# Patient Record
Sex: FEMALE | Race: WHITE | NOT HISPANIC OR LATINO | Employment: OTHER
[De-identification: names, ages, dates, MRNs, and addresses within clinical notes are randomized per-mention and may not be internally consistent; named-entity substitution may affect disease eponyms.]

---

## 2017-12-08 ENCOUNTER — HISTORIC RESULTS (OUTPATIENT)
Dept: ADMINISTRATIVE | Age: 77
End: 2017-12-08

## 2017-12-13 RX ORDER — LISINOPRIL AND HYDROCHLOROTHIAZIDE 12.5; 2 MG/1; MG/1
1 TABLET ORAL DAILY
COMMUNITY
End: 2020-10-13 | Stop reason: DRUGHIGH

## 2017-12-13 RX ORDER — ESTRADIOL 0.1 MG/G
2 CREAM VAGINAL
COMMUNITY
End: 2019-06-19

## 2017-12-13 RX ORDER — DORZOLAMIDE HYDROCHLORIDE AND TIMOLOL MALEATE 20; 5 MG/ML; MG/ML
1 SOLUTION/ DROPS OPHTHALMIC 2 TIMES DAILY
COMMUNITY

## 2017-12-13 RX ORDER — FLUTICASONE PROPIONATE 50 MCG
1 SPRAY, SUSPENSION (ML) NASAL DAILY
COMMUNITY

## 2017-12-14 ENCOUNTER — ANESTHESIA (OUTPATIENT)
Dept: SURGERY | Facility: CLINIC | Age: 77
End: 2017-12-14
Payer: MEDICARE

## 2017-12-14 ENCOUNTER — ANESTHESIA EVENT (OUTPATIENT)
Dept: SURGERY | Facility: CLINIC | Age: 77
End: 2017-12-14
Payer: MEDICARE

## 2017-12-14 ENCOUNTER — HOSPITAL ENCOUNTER (OUTPATIENT)
Facility: CLINIC | Age: 77
Discharge: HOME | End: 2017-12-14
Attending: OPHTHALMOLOGY | Admitting: OPHTHALMOLOGY
Payer: MEDICARE

## 2017-12-14 VITALS
SYSTOLIC BLOOD PRESSURE: 106 MMHG | BODY MASS INDEX: 28.34 KG/M2 | RESPIRATION RATE: 14 BRPM | OXYGEN SATURATION: 97 % | HEIGHT: 64 IN | TEMPERATURE: 97.8 F | WEIGHT: 166 LBS | DIASTOLIC BLOOD PRESSURE: 66 MMHG

## 2017-12-14 PROCEDURE — 40000170 ZZH STATISTIC PRE-PROCEDURE ASSESSMENT II: Performed by: OPHTHALMOLOGY

## 2017-12-14 PROCEDURE — 36000104 ZZH EYE SURGERY LEVEL 4 1ST 30 MIN: Performed by: OPHTHALMOLOGY

## 2017-12-14 PROCEDURE — 25000125 ZZHC RX 250: Performed by: NURSE ANESTHETIST, CERTIFIED REGISTERED

## 2017-12-14 PROCEDURE — 25000128 H RX IP 250 OP 636: Performed by: ANESTHESIOLOGY

## 2017-12-14 PROCEDURE — 66711 ECP CILIARY BODY DESTRUCTION: CPT | Performed by: OPHTHALMOLOGY

## 2017-12-14 PROCEDURE — 37000009 ZZH ANESTHESIA TECHNICAL FEE, EACH ADDTL 15 MIN: Performed by: OPHTHALMOLOGY

## 2017-12-14 PROCEDURE — 71000028 ZZH EYE RECOVERY PHASE 2 EACH 15 MINS: Performed by: OPHTHALMOLOGY

## 2017-12-14 PROCEDURE — 37000008 ZZH ANESTHESIA TECHNICAL FEE, 1ST 30 MIN: Performed by: OPHTHALMOLOGY

## 2017-12-14 PROCEDURE — 25000125 ZZHC RX 250: Performed by: OPHTHALMOLOGY

## 2017-12-14 PROCEDURE — 36000105 ZZH EYE SURGERY LEVEL 4 EA 15 ADDTL MIN: Performed by: OPHTHALMOLOGY

## 2017-12-14 PROCEDURE — 27210794 ZZH OR GENERAL SUPPLY STERILE: Performed by: OPHTHALMOLOGY

## 2017-12-14 PROCEDURE — 25000128 H RX IP 250 OP 636: Performed by: NURSE ANESTHETIST, CERTIFIED REGISTERED

## 2017-12-14 PROCEDURE — 25000128 H RX IP 250 OP 636: Performed by: OPHTHALMOLOGY

## 2017-12-14 PROCEDURE — V2632 POST CHMBR INTRAOCULAR LENS: HCPCS | Performed by: OPHTHALMOLOGY

## 2017-12-14 PROCEDURE — 25000125 ZZHC RX 250: Performed by: ANESTHESIOLOGY

## 2017-12-14 DEVICE — EYE IMP IOL AMO PCL TECNIS ZCB00 21.0: Type: IMPLANTABLE DEVICE | Site: EYE | Status: FUNCTIONAL

## 2017-12-14 RX ORDER — BALANCED SALT SOLUTION 6.4; .75; .48; .3; 3.9; 1.7 MG/ML; MG/ML; MG/ML; MG/ML; MG/ML; MG/ML
SOLUTION OPHTHALMIC PRN
Status: DISCONTINUED | OUTPATIENT
Start: 2017-12-14 | End: 2017-12-14 | Stop reason: HOSPADM

## 2017-12-14 RX ORDER — CYCLOPENTOLATE HYDROCHLORIDE 10 MG/ML
1 SOLUTION/ DROPS OPHTHALMIC
Status: COMPLETED | OUTPATIENT
Start: 2017-12-14 | End: 2017-12-14

## 2017-12-14 RX ORDER — LIDOCAINE HYDROCHLORIDE 10 MG/ML
INJECTION, SOLUTION EPIDURAL; INFILTRATION; INTRACAUDAL; PERINEURAL PRN
Status: DISCONTINUED | OUTPATIENT
Start: 2017-12-14 | End: 2017-12-14 | Stop reason: HOSPADM

## 2017-12-14 RX ORDER — DICLOFENAC SODIUM 1 MG/ML
1 SOLUTION/ DROPS OPHTHALMIC
Status: COMPLETED | OUTPATIENT
Start: 2017-12-14 | End: 2017-12-14

## 2017-12-14 RX ORDER — TETRACAINE HYDROCHLORIDE 5 MG/ML
SOLUTION OPHTHALMIC PRN
Status: DISCONTINUED | OUTPATIENT
Start: 2017-12-14 | End: 2017-12-14 | Stop reason: HOSPADM

## 2017-12-14 RX ORDER — PHENYLEPHRINE HYDROCHLORIDE 25 MG/ML
1 SOLUTION/ DROPS OPHTHALMIC
Status: COMPLETED | OUTPATIENT
Start: 2017-12-14 | End: 2017-12-14

## 2017-12-14 RX ORDER — PROPOFOL 10 MG/ML
INJECTION, EMULSION INTRAVENOUS PRN
Status: DISCONTINUED | OUTPATIENT
Start: 2017-12-14 | End: 2017-12-14

## 2017-12-14 RX ORDER — FENTANYL CITRATE 50 UG/ML
INJECTION, SOLUTION INTRAMUSCULAR; INTRAVENOUS PRN
Status: DISCONTINUED | OUTPATIENT
Start: 2017-12-14 | End: 2017-12-14

## 2017-12-14 RX ORDER — ONDANSETRON 2 MG/ML
INJECTION INTRAMUSCULAR; INTRAVENOUS PRN
Status: DISCONTINUED | OUTPATIENT
Start: 2017-12-14 | End: 2017-12-14

## 2017-12-14 RX ORDER — EPHEDRINE SULFATE 50 MG/ML
INJECTION, SOLUTION INTRAMUSCULAR; INTRAVENOUS; SUBCUTANEOUS PRN
Status: DISCONTINUED | OUTPATIENT
Start: 2017-12-14 | End: 2017-12-14

## 2017-12-14 RX ORDER — SODIUM CHLORIDE, SODIUM LACTATE, POTASSIUM CHLORIDE, CALCIUM CHLORIDE 600; 310; 30; 20 MG/100ML; MG/100ML; MG/100ML; MG/100ML
INJECTION, SOLUTION INTRAVENOUS CONTINUOUS
Status: DISCONTINUED | OUTPATIENT
Start: 2017-12-14 | End: 2017-12-14 | Stop reason: HOSPADM

## 2017-12-14 RX ORDER — LIDOCAINE HYDROCHLORIDE 20 MG/ML
INJECTION, SOLUTION INFILTRATION; PERINEURAL PRN
Status: DISCONTINUED | OUTPATIENT
Start: 2017-12-14 | End: 2017-12-14

## 2017-12-14 RX ORDER — MOXIFLOXACIN 5 MG/ML
1 SOLUTION/ DROPS OPHTHALMIC
Status: COMPLETED | OUTPATIENT
Start: 2017-12-14 | End: 2017-12-14

## 2017-12-14 RX ORDER — PROPARACAINE HYDROCHLORIDE 5 MG/ML
1 SOLUTION/ DROPS OPHTHALMIC ONCE
Status: COMPLETED | OUTPATIENT
Start: 2017-12-14 | End: 2017-12-14

## 2017-12-14 RX ORDER — TROPICAMIDE 10 MG/ML
1 SOLUTION/ DROPS OPHTHALMIC
Status: COMPLETED | OUTPATIENT
Start: 2017-12-14 | End: 2017-12-14

## 2017-12-14 RX ADMIN — DICLOFENAC SODIUM 1 DROP: 1 SOLUTION OPHTHALMIC at 07:24

## 2017-12-14 RX ADMIN — MOXIFLOXACIN 1 DROP: 5 SOLUTION/ DROPS OPHTHALMIC at 07:24

## 2017-12-14 RX ADMIN — DICLOFENAC SODIUM 1 DROP: 1 SOLUTION OPHTHALMIC at 07:20

## 2017-12-14 RX ADMIN — PHENYLEPHRINE HYDROCHLORIDE 1 DROP: 2.5 SOLUTION/ DROPS OPHTHALMIC at 07:20

## 2017-12-14 RX ADMIN — MOXIFLOXACIN 1 DROP: 5 SOLUTION/ DROPS OPHTHALMIC at 07:20

## 2017-12-14 RX ADMIN — PROPOFOL 60 MG: 10 INJECTION, EMULSION INTRAVENOUS at 08:18

## 2017-12-14 RX ADMIN — PROPARACAINE HYDROCHLORIDE 1 DROP: 5 SOLUTION/ DROPS OPHTHALMIC at 07:12

## 2017-12-14 RX ADMIN — CYCLOPENTOLATE HYDROCHLORIDE 1 DROP: 10 SOLUTION/ DROPS OPHTHALMIC at 07:32

## 2017-12-14 RX ADMIN — ONDANSETRON 4 MG: 2 INJECTION INTRAMUSCULAR; INTRAVENOUS at 08:40

## 2017-12-14 RX ADMIN — PHENYLEPHRINE HYDROCHLORIDE 1 DROP: 2.5 SOLUTION/ DROPS OPHTHALMIC at 07:24

## 2017-12-14 RX ADMIN — TROPICAMIDE 1 DROP: 10 SOLUTION/ DROPS OPHTHALMIC at 07:20

## 2017-12-14 RX ADMIN — LIDOCAINE HYDROCHLORIDE 60 MG: 20 INJECTION, SOLUTION INFILTRATION; PERINEURAL at 08:18

## 2017-12-14 RX ADMIN — LIDOCAINE HYDROCHLORIDE 1 ML: 10 INJECTION, SOLUTION EPIDURAL; INFILTRATION; INTRACAUDAL; PERINEURAL at 07:38

## 2017-12-14 RX ADMIN — DEXMEDETOMIDINE HYDROCHLORIDE 12 MCG: 100 INJECTION, SOLUTION INTRAVENOUS at 08:17

## 2017-12-14 RX ADMIN — Medication 5 MG: at 08:27

## 2017-12-14 RX ADMIN — CYCLOPENTOLATE HYDROCHLORIDE 1 DROP: 10 SOLUTION/ DROPS OPHTHALMIC at 07:20

## 2017-12-14 RX ADMIN — FENTANYL CITRATE 50 MCG: 50 INJECTION, SOLUTION INTRAMUSCULAR; INTRAVENOUS at 08:18

## 2017-12-14 RX ADMIN — DICLOFENAC SODIUM 1 DROP: 1 SOLUTION OPHTHALMIC at 07:12

## 2017-12-14 RX ADMIN — MOXIFLOXACIN 1 DROP: 5 SOLUTION/ DROPS OPHTHALMIC at 07:12

## 2017-12-14 RX ADMIN — PHENYLEPHRINE HYDROCHLORIDE 1 DROP: 2.5 SOLUTION/ DROPS OPHTHALMIC at 07:12

## 2017-12-14 RX ADMIN — DEXMEDETOMIDINE HYDROCHLORIDE 8 MCG: 100 INJECTION, SOLUTION INTRAVENOUS at 08:53

## 2017-12-14 RX ADMIN — SODIUM CHLORIDE, POTASSIUM CHLORIDE, SODIUM LACTATE AND CALCIUM CHLORIDE: 600; 310; 30; 20 INJECTION, SOLUTION INTRAVENOUS at 07:38

## 2017-12-14 RX ADMIN — CYCLOPENTOLATE HYDROCHLORIDE 1 DROP: 10 SOLUTION/ DROPS OPHTHALMIC at 07:12

## 2017-12-14 RX ADMIN — TROPICAMIDE 1 DROP: 10 SOLUTION/ DROPS OPHTHALMIC at 07:12

## 2017-12-14 RX ADMIN — TROPICAMIDE 1 DROP: 10 SOLUTION/ DROPS OPHTHALMIC at 07:24

## 2017-12-14 RX ADMIN — PROPOFOL 10 MG: 10 INJECTION, EMULSION INTRAVENOUS at 08:55

## 2017-12-14 ASSESSMENT — ENCOUNTER SYMPTOMS
SEIZURES: 0
ORTHOPNEA: 0

## 2017-12-14 NOTE — DISCHARGE INSTRUCTIONS
[facility] Discharge  Instructions  Anesthesia ([facility])   Adult Discharge Instructions    For 24 hours after surgery    1. Get plenty of rest.  Make arrangements to have a responsible adult stay with you for at least 6 hours after you leave the hospital.  2. Do not drive or use heavy equipment for 24 hours.    3. Do not drink alcohol for 24 hours.  4. Do not sign legal documents or make important decisions for 24 hours.  5. Avoid strenuous or risky activities. You may feel lightheaded.  If so, sit for a few minutes before standing.  Have someone help you get up.   6. Conscious sedation patients may resume a regular diet..  7. Any questions of medical nature, call your physician.    [facility]  Cataract Surgery Discharge Instructions  [facility] MD JAKE Rodríguez MD J. Hasan, MD C. Nichols, MD J. O'Neill, MD S. Schaefer, MD J. Stephens, MD        Start using drops when you arrive at home today    Vigamox or Ofloxacin (Tan top) - one drop in surgical eye 3 times  per day until gone.     Prednisolone acetate 1.0% (pink or white top) - one drop in surgical eye 3 times per day until gone.    Ketorolac or Diclofenac (Grey top) - one drop in surgical eye 3 times per day until gone.   (Dr. Zamora's patients should use all drops 4 times daily)       OR     You may have been prescribed SmartDrops or OneDrop, which is a compound formula drop that combines all three medications in a single drop. This drop should be instilled to the surgical eye 3 times daily until gone.        Place shield over surgical eye at bedtime for 3 nights.      The eye will feel itchy, scratchy, and vision will be blurred, you may take Tylenol for the scratchy feeling if this is bothersome.      No eye rubbing or swimming for I week.      You may resume all prescription medications as directed by your primary  doctor.      Call if increasing pain, progressively worsening vision or worsening redness of surgical eye.      On-call doctor can be reached at [phone].

## 2017-12-14 NOTE — ANESTHESIA PREPROCEDURE EVALUATION
"Procedure: Procedure(s):  COMBINED PHACOEMULSIFICATION CLEAR CORNEA WITH STANDARD INTRAOCULAR LENS IMPLANT, ENDOSCOPIC CYCLOPHOTOCOAGULATION  Preop diagnosis: RIGHT EYE CATARACT  No Known Allergies  There is no problem list on file for this patient.    Past Medical History:   Diagnosis Date     Allergic rhinitis      Closed dislocation, multiple and ill-defined sites      Compression fracture of spine (H)      Coronary artery disease      Degeneration of intervertebral disc, site unspecified      Disorder of bone and cartilage, unspecified      Essential hypertension, benign      Glaucoma of both eyes      Macular degeneration      Other abnormal clinical finding      Other and unspecified hyperlipidemia      Sprain and strain of unspecified site of knee and leg      Vitamin D deficiency      Past Surgical History:   Procedure Laterality Date     C NONSPECIFIC PROCEDURE  1988    rt shoulder dislocation and fx     C NONSPECIFIC PROCEDURE  1971    TL     GYN SURGERY      tubal     ORTHOPEDIC SURGERY Right     LYSSA       No current facility-administered medications on file prior to encounter.   No current outpatient prescriptions on file prior to encounter.  Ht 1.626 m (5' 4\")  Wt 75.3 kg (166 lb)  BMI 28.49 kg/m2     EKG - SR, LAD    Anesthesia Evaluation     . Pt has had prior anesthetic.     No history of anesthetic complications          ROS/MED HX    ENT/Pulmonary:     (+)allergic rhinitis, , . .   (-) sleep apnea and recent URI   Neurologic:  - neg neurologic ROS    (-) seizures, Neuropathy and migraines   Cardiovascular:     (+) hypertension--CAD (coronary calcifications), --. : . . . :. .      (-) angina, CHF, orthopnea/PND and angina   METS/Exercise Tolerance:     Hematologic:  - neg hematologic  ROS       Musculoskeletal: Comment: Low back pain  Compression fracture  osteoporosis        GI/Hepatic:  - neg GI/hepatic ROS      (-) GERD   Renal/Genitourinary:  - ROS Renal section negative       Endo:  - neg " endo ROS    (-) Type II DM and thyroid disease   Psychiatric:  - neg psychiatric ROS       Infectious Disease:         Malignancy:         Other: Comment: Glaucoma  Macular degeneration                    Physical Exam  Normal systems: cardiovascular, pulmonary and dental    Airway   Mallampati: II  TM distance: >3 FB  Neck ROM: full    Dental     Cardiovascular   Rhythm and rate: regular and normal      Pulmonary    breath sounds clear to auscultation                    Anesthesia Plan      History & Physical Review  History and physical reviewed and following examination; no interval change.    ASA Status:  2 .    NPO Status:  > 8 hours    Plan for MAC          Postoperative Care  Postoperative pain management:  IV analgesics.      Consents  Anesthetic plan, risks, benefits and alternatives discussed with:  Patient..                          .

## 2017-12-14 NOTE — ANESTHESIA CARE TRANSFER NOTE
Patient: Josi Singh    Procedure(s):  RIGHT EYE COMBINED PHACOEMULSIFICATION CLEAR CORNEA WITH STANDARD INTRAOCULAR LENS IMPLANT, ENDOSCOPIC CYCLOPHOTOCOAGULATION  - Wound Class: I-Clean    Diagnosis: RIGHT EYE CATARACT  Diagnosis Additional Information: No value filed.    Anesthesia Type:   MAC     Note:  Airway :Room Air  Patient transferred to:Phase II  Comments: Patient alert and appropriate. VSS. Report given to RN. Handoff Report: Identifed the Patient, Identified the Reponsible Provider, Reviewed the pertinent medical history, Discussed the surgical course, Reviewed Intra-OP anesthesia mangement and issues during anesthesia, Set expectations for post-procedure period and Allowed opportunity for questions and acknowledgement of understanding      Vitals: (Last set prior to Anesthesia Care Transfer)    CRNA VITALS  12/14/2017 0832 - 12/14/2017 0908      12/14/2017             Ht Rate: 66    Resp Rate (set): 10                Electronically Signed By: ILDA Savage CRNA  December 14, 2017  9:08 AM

## 2017-12-14 NOTE — IP AVS SNAPSHOT
[facility]    [address]    [place] [address]    Phone:  [phone]    Fax:  [phone]                                       After Visit Summary   12/14/2017    Josi Snigh    MRN: [id number]           After Visit Summary Signature Page     I have received my discharge instructions, and my questions have been answered. I have discussed any challenges I see with this plan with the nurse or doctor.    ..........................................................................................................................................  Patient/Patient Representative Signature      ..........................................................................................................................................  Patient Representative Print Name and Relationship to Patient    ..................................................               ................................................  Date                                            Time    ..........................................................................................................................................  Reviewed by Signature/Title    ...................................................              ..............................................  Date                                                            Time

## 2017-12-14 NOTE — ANESTHESIA POSTPROCEDURE EVALUATION
Patient: Josi Singh    Procedure(s):  RIGHT EYE COMBINED PHACOEMULSIFICATION CLEAR CORNEA WITH STANDARD INTRAOCULAR LENS IMPLANT, ENDOSCOPIC CYCLOPHOTOCOAGULATION  - Wound Class: I-Clean    Diagnosis:RIGHT EYE CATARACT  Diagnosis Additional Information: No value filed.    Anesthesia Type:  MAC    Note:  Anesthesia Post Evaluation    Patient location during evaluation: PACU  Patient participation: Able to fully participate in evaluation  Level of consciousness: awake  Pain management: adequate  Airway patency: patent  Cardiovascular status: acceptable  Respiratory status: acceptable  Hydration status: acceptable  PONV: none     Anesthetic complications: None          Last vitals:  Vitals:    12/14/17 0913 12/14/17 0925 12/14/17 0929   BP: 93/57 99/63 106/66   Resp:  14    Temp:      SpO2: 95% 97% 97%         Electronically Signed By: Jerry Alexis MD  December 14, 2017  12:33 PM

## 2017-12-14 NOTE — IP AVS SNAPSHOT
MRN:[id number]                      After Visit Summary   12/14/2017    Josi Singh    MRN: [id number]           Thank you!     Thank you for choosing [facility] for your care. Our goal is always to provide you with excellent care. Hearing back from our patients is one way we can continue to improve our services. Please take a few minutes to complete the written survey that you may receive in the mail after you visit with us. Thank you!        Patient Information     Date Of Birth          1940        About your hospital stay     You were admitted on:  December 14, 2017 You last received care in the:  [facility]    You were discharged on:  December 14, 2017       Who to Call     For medical emergencies, please call 911.  For non-urgent questions about your medical care, please call your primary care provider or clinic, [phone]  For questions related to your surgery, please call your surgery clinic        Attending Provider     Provider Specialty    Yosvany Zamora MD Ophthalmology       Primary Care Provider Office Phone # Fax #    Micaela PORTILLO Derek Marquez [phone]      Further instructions from your care team       [facility] Discharge  Instructions  Anesthesia ([facility] Care Center)   Adult Discharge Instructions    For 24 hours after surgery    1. Get plenty of rest.  Make arrangements to have a responsible adult stay with you for at least 6 hours after you leave the hospital.  2. Do not drive or use heavy equipment for 24 hours.    3. Do not drink alcohol for 24 hours.  4. Do not sign legal documents or make important decisions for 24 hours.  5. Avoid strenuous or risky activities. You may feel lightheaded.  If so, sit for a few minutes before standing.  Have someone help you get up.   6. Conscious sedation patients may resume a regular diet..  7. Any questions of medical nature, call your physician.    [facility]  "[facility]  Cataract Surgery Discharge Instructions  [facility] MD JAKE Rodríguez MD J. Hasan, MD C. Nichols, MD J. O'Neill, MD S. Schaefer, MD J. Stephens, MD        Start using drops when you arrive at home today    Vigamox or Ofloxacin (Tan top) - one drop in surgical eye 3 times  per day until gone.     Prednisolone acetate 1.0% (pink or white top) - one drop in surgical eye 3 times per day until gone.    Ketorolac or Diclofenac (Grey top) - one drop in surgical eye 3 times per day until gone.   (Dr. Zamora's patients should use all drops 4 times daily)       OR     You may have been prescribed SmartDrops or OneDrop, which is a compound formula drop that combines all three medications in a single drop. This drop should be instilled to the surgical eye 3 times daily until gone.        Place shield over surgical eye at bedtime for 3 nights.      The eye will feel itchy, scratchy, and vision will be blurred, you may take Tylenol for the scratchy feeling if this is bothersome.      No eye rubbing or swimming for I week.      You may resume all prescription medications as directed by your primary doctor.      Call if increasing pain, progressively worsening vision or worsening redness of surgical eye.      On-call doctor can be reached at [phone].    Pending Results     No orders found from 12/12/2017 to 12/15/2017.            Admission Information     Date & Time Provider Department Dept. Phone    12/14/2017 Yosvany Zamora MD [facility] [place] [phone]      Your Vitals Were     Blood Pressure Temperature Respirations Height Weight Pulse Oximetry    83/53 97.8  F (36.6  C) (Temporal) 16 1.626 m (5' 4\") 75.3 kg (166 lb) 96%    BMI (Body Mass Index)                   28.49 kg/m2           MyChart Information     [facility] [employer] lets you send messages to your doctor, view your test results, renew your " "prescriptions, schedule appointments and more. To sign up, go to [email or web address] . Click on \"Log in\" on the left side of the screen, which will take you to the Welcome page. Then click on \"Sign up Now\" on the right side of the page.     You will be asked to enter the access code listed below, as well as some personal information. Please follow the directions to create your username and password.     Your access code is: [email or web address]  Expires: 3/14/2018  9:13 AM     Your access code will  in 90 days. If you need help or a new code, please call your [facility] clinic or [phone].        Care EveryWhere ID     This is your Care EveryWhere ID. This could be used by other organizations to access your [facility] medical records  [id number]        Equal Access to Services     ROB ARELLANO : Carlie Puri, aimee bowen, mina israel, zakia amezcua . So [facility] [phone].    ATENCIÓN: Si habla español, tiene a gama disposición servicios gratuitos de asistencia lingüística. Alecia al [phone].    We comply with applicable federal civil rights laws and [place] laws. We do not discriminate on the basis of race, color, national origin, age, disability, sex, sexual orientation, or gender identity.               Review of your medicines      UNREVIEWED medicines. Ask your doctor about these medicines        Dose / Directions    ACETAMINOPHEN PO        Dose:  500 mg   Take 500 mg by mouth   Refills:  0       bimatoprost 0.01 % Soln   Commonly known as:  LUMIGAN        Dose:  1 drop   Place 1 drop into both eyes At Bedtime   Refills:  0       COSOPT 2-0.5 % ophthalmic solution   Generic drug:  dorzolamide-timolol        Dose:  1 drop   Place 1 drop into both eyes 2 times daily   Refills:  0       estradiol 0.1 MG/GM cream   Commonly known as:  ESTRACE        Dose:  2 g   Place 2 g vaginally three times a week   Refills:  0       fluticasone 50 MCG/ACT " spray   Commonly known as:  FLONASE        Dose:  1 spray   Spray 1 spray into both nostrils daily   Refills:  0       FOSAMAX PO        Dose:  70 mg   Take 70 mg by mouth once a week   Refills:  0       GABAPENTIN PO        Dose:  300 mg   Take 300 mg by mouth daily   Refills:  0       lisinopril-hydrochlorothiazide 20-12.5 MG per tablet   Commonly known as:  PRINZIDE/ZESTORETIC        Dose:  1 tablet   Take 1 tablet by mouth daily   Refills:  0                Protect others around you: Learn how to safely use, store and throw away your medicines at www.disposemymeds.org.             Medication List: This is a list of all your medications and when to take them. Check marks below indicate your daily home schedule. Keep this list as a reference.      Medications           Morning Afternoon Evening Bedtime As Needed    ACETAMINOPHEN PO   Take 500 mg by mouth                                bimatoprost 0.01 % Soln   Commonly known as:  LUMIGAN   Place 1 drop into both eyes At Bedtime                                COSOPT 2-0.5 % ophthalmic solution   Place 1 drop into both eyes 2 times daily   Generic drug:  dorzolamide-timolol                                estradiol 0.1 MG/GM cream   Commonly known as:  ESTRACE   Place 2 g vaginally three times a week                                fluticasone 50 MCG/ACT spray   Commonly known as:  FLONASE   Spray 1 spray into both nostrils daily                                FOSAMAX PO   Take 70 mg by mouth once a week                                GABAPENTIN PO   Take 300 mg by mouth daily                                lisinopril-hydrochlorothiazide 20-12.5 MG per tablet   Commonly known as:  PRINZIDE/ZESTORETIC   Take 1 tablet by mouth daily

## 2017-12-14 NOTE — BRIEF OP NOTE
[facility]  Ophthalmology Brief Operative Note    Pre-operative diagnosis: RIGHT EYE CATARACT   Post-operative diagnosis Same   Operative Eye Right   Procedure: Procedure(s):  COMBINED PHACOEMULSIFICATION CLEAR CORNEA WITH STANDARD INTRAOCULAR LENS IMPLANT, ENDOSCOPIC CYCLOPHOTOCOAGULATION   Surgeon: Yosvany Zamora MD   Anesthesia: Combined MAC with Retrobulbar    Estimated blood loss: Trace    Complications: None   Condition: Stable   Comments: See dictated operative report for full details         Implant Name Type Inv. Item Serial No.  Lot No. LRB No. Used   EYE IMP IOL JUWAN PCL TECNIS ZCB00 21.0 Lens/Eye Implant EYE IMP IOL JUWAN PCL TECNIS ZCB00 21.0 [id number] [employer]   Right 1

## 2017-12-15 NOTE — OP NOTE
DATE OF PROCEDURE:  12/14/2017       PREOPERATIVE DIAGNOSES:     1.  Cataract, right eye.    2.  Glaucoma, right eye.         POSTOPERATIVE DIAGNOSES:     1.  Cataract, right eye.    2.  Glaucoma, right eye.         PROCEDURE:     1.  Phacoemulsification with posterior chamber intraocular lens, right eye.     2.  Endocyclophotocoagulation, right eye.       ANESTHESIA:  Retrobulbar.   ESTIMATED BLOOD LOSS:  Trace.       PATIENT IDENTIFICATION:  Josi Singh is a 77-year-old female who has noted progressive decreased vision in her right eye.  After evaluation, she was found to have cataract and glaucoma as the primary cause of her 20/40 vision.  After a complete discussion of the risks and benefits of the procedure with the patient, she elected to have her cataract removed.  She understands that her final vision and healing will be limited by her glaucoma and high corneal astigmatism.         DESCRIPTION OF PROCEDURE:    Retrobulbar anesthesia was given in the operating room.  This consisted of a 50/50 mixture of 2% lidocaine and 0.75% bupivacaine.  A Honan balloon was placed over the operative eye for a few minutes.  The operative eye was then prepped and draped  in the usual sterile fashion, including drops of Betadine on its surface.       A paracentesis tract was placed through which 1% non-preserved lidocaine was instilled.  Viscoat was used to fill the anterior chamber.  A temporal clear corneal incision was made with a 2.4 mm keratome.  A circular continuous capsulorrhexis was fashioned with a cystotome and Utrata forceps.  Hydrodissection of the lens nucleus was performed and this was phacoemulsified using a modified stop and chop technique.  The remaining cortex was removed using automated irrigation and aspiration.  The posterior capsule was then filled with Provisc and a foldable JUWAN Model ZCB00 21.0 diopter lens was inserted into the capsular bag.  The remaining viscoelastic was removed using the automated  I&A tip.         Amvisc Plus was used to fill the ciliary sulcus of the patient's right eye.  A curved endocyclophotocoagulation probe was  introduced and a total of 60 pulses were given at a power setting of 0.25 santana.  The remaining viscoelastic was carefully removed.  The anterior chamber was reformed using balanced salt solution.  Drops of Betadine and Vigamox were placed over the operative eye, as was a clear shield.  The patient returned to the recovery area in stable condition.         EFRA HDZ MD, FACS             D: 2017 12:07   T: 2017 22:28   MT: CONSUELO#[id number]      Name:     HIMANSHU LORENZO   MRN:      [id number]        Account:        [id number]   :      1940           Procedure Date: 2017      Document: [id number]

## 2018-01-29 ENCOUNTER — HISTORIC RESULTS (OUTPATIENT)
Dept: ADMINISTRATIVE | Age: 78
End: 2018-01-29

## 2018-02-01 ENCOUNTER — ANESTHESIA (OUTPATIENT)
Dept: SURGERY | Facility: CLINIC | Age: 78
End: 2018-02-01
Payer: MEDICARE

## 2018-02-01 ENCOUNTER — ANESTHESIA EVENT (OUTPATIENT)
Dept: SURGERY | Facility: CLINIC | Age: 78
End: 2018-02-01
Payer: MEDICARE

## 2018-02-01 ENCOUNTER — HOSPITAL ENCOUNTER (OUTPATIENT)
Facility: CLINIC | Age: 78
Discharge: HOME | End: 2018-02-01
Attending: OPHTHALMOLOGY | Admitting: OPHTHALMOLOGY
Payer: MEDICARE

## 2018-02-01 VITALS
TEMPERATURE: 97.7 F | BODY MASS INDEX: 28.68 KG/M2 | RESPIRATION RATE: 16 BRPM | WEIGHT: 168 LBS | DIASTOLIC BLOOD PRESSURE: 68 MMHG | OXYGEN SATURATION: 98 % | SYSTOLIC BLOOD PRESSURE: 112 MMHG | HEIGHT: 64 IN

## 2018-02-01 PROCEDURE — 25000128 H RX IP 250 OP 636: Performed by: NURSE ANESTHETIST, CERTIFIED REGISTERED

## 2018-02-01 PROCEDURE — 36000104 ZZH EYE SURGERY LEVEL 4 1ST 30 MIN: Performed by: OPHTHALMOLOGY

## 2018-02-01 PROCEDURE — 25000125 ZZHC RX 250: Performed by: ANESTHESIOLOGY

## 2018-02-01 PROCEDURE — 66711 ECP CILIARY BODY DESTRUCTION: CPT | Performed by: OPHTHALMOLOGY

## 2018-02-01 PROCEDURE — 40000170 ZZH STATISTIC PRE-PROCEDURE ASSESSMENT II: Performed by: OPHTHALMOLOGY

## 2018-02-01 PROCEDURE — 36000105 ZZH EYE SURGERY LEVEL 4 EA 15 ADDTL MIN: Performed by: OPHTHALMOLOGY

## 2018-02-01 PROCEDURE — V2632 POST CHMBR INTRAOCULAR LENS: HCPCS | Performed by: OPHTHALMOLOGY

## 2018-02-01 PROCEDURE — 71000028 ZZH EYE RECOVERY PHASE 2 EACH 15 MINS: Performed by: OPHTHALMOLOGY

## 2018-02-01 PROCEDURE — 25000125 ZZHC RX 250: Performed by: OPHTHALMOLOGY

## 2018-02-01 PROCEDURE — 37000008 ZZH ANESTHESIA TECHNICAL FEE, 1ST 30 MIN: Performed by: OPHTHALMOLOGY

## 2018-02-01 PROCEDURE — 25000128 H RX IP 250 OP 636: Performed by: ANESTHESIOLOGY

## 2018-02-01 PROCEDURE — 25000125 ZZHC RX 250: Performed by: NURSE ANESTHETIST, CERTIFIED REGISTERED

## 2018-02-01 PROCEDURE — 37000009 ZZH ANESTHESIA TECHNICAL FEE, EACH ADDTL 15 MIN: Performed by: OPHTHALMOLOGY

## 2018-02-01 PROCEDURE — 27210794 ZZH OR GENERAL SUPPLY STERILE: Performed by: OPHTHALMOLOGY

## 2018-02-01 PROCEDURE — 25000128 H RX IP 250 OP 636: Performed by: OPHTHALMOLOGY

## 2018-02-01 DEVICE — EYE IMP IOL AMO PCL TECNIS ZCB00 20.5: Type: IMPLANTABLE DEVICE | Site: EYE | Status: FUNCTIONAL

## 2018-02-01 RX ORDER — TROPICAMIDE 10 MG/ML
1 SOLUTION/ DROPS OPHTHALMIC
Status: COMPLETED | OUTPATIENT
Start: 2018-02-01 | End: 2018-02-01

## 2018-02-01 RX ORDER — SODIUM CHLORIDE, SODIUM LACTATE, POTASSIUM CHLORIDE, CALCIUM CHLORIDE 600; 310; 30; 20 MG/100ML; MG/100ML; MG/100ML; MG/100ML
INJECTION, SOLUTION INTRAVENOUS CONTINUOUS
Status: DISCONTINUED | OUTPATIENT
Start: 2018-02-01 | End: 2018-02-01 | Stop reason: HOSPADM

## 2018-02-01 RX ORDER — PROPARACAINE HYDROCHLORIDE 5 MG/ML
1 SOLUTION/ DROPS OPHTHALMIC ONCE
Status: COMPLETED | OUTPATIENT
Start: 2018-02-01 | End: 2018-02-01

## 2018-02-01 RX ORDER — ONDANSETRON 2 MG/ML
INJECTION INTRAMUSCULAR; INTRAVENOUS PRN
Status: DISCONTINUED | OUTPATIENT
Start: 2018-02-01 | End: 2018-02-01

## 2018-02-01 RX ORDER — BALANCED SALT SOLUTION 6.4; .75; .48; .3; 3.9; 1.7 MG/ML; MG/ML; MG/ML; MG/ML; MG/ML; MG/ML
SOLUTION OPHTHALMIC PRN
Status: DISCONTINUED | OUTPATIENT
Start: 2018-02-01 | End: 2018-02-01 | Stop reason: HOSPADM

## 2018-02-01 RX ORDER — LIDOCAINE HYDROCHLORIDE 10 MG/ML
INJECTION, SOLUTION EPIDURAL; INFILTRATION; INTRACAUDAL; PERINEURAL PRN
Status: DISCONTINUED | OUTPATIENT
Start: 2018-02-01 | End: 2018-02-01 | Stop reason: HOSPADM

## 2018-02-01 RX ORDER — PROPOFOL 10 MG/ML
INJECTION, EMULSION INTRAVENOUS PRN
Status: DISCONTINUED | OUTPATIENT
Start: 2018-02-01 | End: 2018-02-01

## 2018-02-01 RX ORDER — CYCLOPENTOLATE HYDROCHLORIDE 10 MG/ML
1 SOLUTION/ DROPS OPHTHALMIC
Status: COMPLETED | OUTPATIENT
Start: 2018-02-01 | End: 2018-02-01

## 2018-02-01 RX ORDER — PHENYLEPHRINE HYDROCHLORIDE 25 MG/ML
1 SOLUTION/ DROPS OPHTHALMIC
Status: COMPLETED | OUTPATIENT
Start: 2018-02-01 | End: 2018-02-01

## 2018-02-01 RX ORDER — DICLOFENAC SODIUM 1 MG/ML
1 SOLUTION/ DROPS OPHTHALMIC
Status: COMPLETED | OUTPATIENT
Start: 2018-02-01 | End: 2018-02-01

## 2018-02-01 RX ORDER — MOXIFLOXACIN 5 MG/ML
1 SOLUTION/ DROPS OPHTHALMIC
Status: COMPLETED | OUTPATIENT
Start: 2018-02-01 | End: 2018-02-01

## 2018-02-01 RX ADMIN — PROPOFOL 30 MG: 10 INJECTION, EMULSION INTRAVENOUS at 08:10

## 2018-02-01 RX ADMIN — PHENYLEPHRINE HYDROCHLORIDE 1 DROP: 2.5 SOLUTION/ DROPS OPHTHALMIC at 07:00

## 2018-02-01 RX ADMIN — MOXIFLOXACIN 1 DROP: 5 SOLUTION/ DROPS OPHTHALMIC at 07:00

## 2018-02-01 RX ADMIN — ONDANSETRON 4 MG: 2 INJECTION INTRAMUSCULAR; INTRAVENOUS at 08:32

## 2018-02-01 RX ADMIN — DICLOFENAC SODIUM 1 DROP: 1 SOLUTION OPHTHALMIC at 06:56

## 2018-02-01 RX ADMIN — CYCLOPENTOLATE HYDROCHLORIDE 1 DROP: 10 SOLUTION/ DROPS OPHTHALMIC at 07:04

## 2018-02-01 RX ADMIN — DICLOFENAC SODIUM 1 DROP: 1 SOLUTION OPHTHALMIC at 07:00

## 2018-02-01 RX ADMIN — SODIUM CHLORIDE, POTASSIUM CHLORIDE, SODIUM LACTATE AND CALCIUM CHLORIDE: 600; 310; 30; 20 INJECTION, SOLUTION INTRAVENOUS at 07:06

## 2018-02-01 RX ADMIN — TROPICAMIDE 1 DROP: 10 SOLUTION/ DROPS OPHTHALMIC at 06:55

## 2018-02-01 RX ADMIN — PHENYLEPHRINE HYDROCHLORIDE 150 MCG: 10 INJECTION INTRAVENOUS at 08:43

## 2018-02-01 RX ADMIN — PHENYLEPHRINE HYDROCHLORIDE 1 DROP: 2.5 SOLUTION/ DROPS OPHTHALMIC at 06:55

## 2018-02-01 RX ADMIN — TROPICAMIDE 1 DROP: 10 SOLUTION/ DROPS OPHTHALMIC at 07:04

## 2018-02-01 RX ADMIN — MOXIFLOXACIN 1 DROP: 5 SOLUTION/ DROPS OPHTHALMIC at 06:56

## 2018-02-01 RX ADMIN — PHENYLEPHRINE HYDROCHLORIDE 1 DROP: 2.5 SOLUTION/ DROPS OPHTHALMIC at 07:05

## 2018-02-01 RX ADMIN — CYCLOPENTOLATE HYDROCHLORIDE 1 DROP: 10 SOLUTION/ DROPS OPHTHALMIC at 07:00

## 2018-02-01 RX ADMIN — DICLOFENAC SODIUM 1 DROP: 1 SOLUTION OPHTHALMIC at 07:04

## 2018-02-01 RX ADMIN — DEXMEDETOMIDINE HYDROCHLORIDE 8 MCG: 100 INJECTION, SOLUTION INTRAVENOUS at 08:06

## 2018-02-01 RX ADMIN — PHENYLEPHRINE HYDROCHLORIDE 100 MCG: 10 INJECTION INTRAVENOUS at 08:19

## 2018-02-01 RX ADMIN — PROPARACAINE HYDROCHLORIDE 1 DROP: 5 SOLUTION/ DROPS OPHTHALMIC at 06:55

## 2018-02-01 RX ADMIN — LIDOCAINE HYDROCHLORIDE 1 ML: 10 INJECTION, SOLUTION EPIDURAL; INFILTRATION; INTRACAUDAL; PERINEURAL at 07:07

## 2018-02-01 RX ADMIN — PHENYLEPHRINE HYDROCHLORIDE 100 MCG: 10 INJECTION INTRAVENOUS at 08:26

## 2018-02-01 RX ADMIN — MIDAZOLAM 1 MG: 1 INJECTION INTRAMUSCULAR; INTRAVENOUS at 08:04

## 2018-02-01 RX ADMIN — TROPICAMIDE 1 DROP: 10 SOLUTION/ DROPS OPHTHALMIC at 07:00

## 2018-02-01 RX ADMIN — MOXIFLOXACIN 1 DROP: 5 SOLUTION/ DROPS OPHTHALMIC at 07:04

## 2018-02-01 RX ADMIN — DEXMEDETOMIDINE HYDROCHLORIDE 8 MCG: 100 INJECTION, SOLUTION INTRAVENOUS at 08:24

## 2018-02-01 RX ADMIN — PHENYLEPHRINE HYDROCHLORIDE 150 MCG: 10 INJECTION INTRAVENOUS at 08:36

## 2018-02-01 RX ADMIN — CYCLOPENTOLATE HYDROCHLORIDE 1 DROP: 10 SOLUTION/ DROPS OPHTHALMIC at 06:56

## 2018-02-01 ASSESSMENT — LIFESTYLE VARIABLES: TOBACCO_USE: 0

## 2018-02-01 ASSESSMENT — COPD QUESTIONNAIRES: COPD: 0

## 2018-02-01 NOTE — OP NOTE
Procedure Date: 02/01/2018      PREOPERATIVE DIAGNOSES:     1.  Nuclear cataract, left eye.   2.  Glaucoma.       POSTOPERATIVE DIAGNOSES:     1.  Nuclear cataract, left eye.   2.  Glaucoma.      OPERATION:     1.  Phacoemulsification with posterior chamber intraocular lens, left eye.     2.  Endocyclophotocoagulation, left eye.       ANESTHESIA:  Retrobulbar.      ESTIMATED BLOOD LOSS:  Trace.       PATIENT IDENTIFICATION:  The patient is a 77-year-old female who has noted progressive decreased vision in her left eye.  After evaluation, she was found to have cataract and glaucoma as the primary causes of her 20/40 vision.  After a complete discussion of the risks and benefits of the procedure with the patient, she elected to have her cataract removed.  She requested a combined cataract and glaucoma surgery be performed.  She also requested that a lens implant be placed to leave her myopic.  No guarantees were given.       DESCRIPTION OF PROCEDURE:    Retrobulbar anesthesia was given in the operating room.  This consisted of a 50/50 mixture of 2% lidocaine and 0.75% bupivacaine.  A Honan balloon was placed over the operative eye for a few minutes.  The operative eye was then prepped and draped  in the usual sterile fashion, including drops of Betadine on its surface.       A paracentesis tract was placed through which 1% non-preserved lidocaine was instilled.  Viscoat was used to fill the anterior chamber.  A temporal clear corneal incision was made with a 2.4 mm keratome.  A circular continuous capsulorrhexis was fashioned with a cystotome and Utrata forceps.  Hydrodissection of the lens nucleus was performed and this was phacoemulsified using a modified stop and chop technique.  The remaining cortex was removed using automated irrigation and aspiration.  The posterior capsule was then filled with Provisc and a foldable JUWAN model ZCB00 20.5 diopter lens was inserted into the capsular bag.  The remaining  viscoelastic was removed using the automated I&A tip.         Amvisc Plus was used to fill the ciliary sulcus of the patient's left eye.  A curved endocyclophotocoagulation probe was  introduced and a total of 60 pulses were given at a power setting of 0.25 santana.  The remaining viscoelastic was carefully removed.  The anterior chamber was reformed using balanced salt solution.  Drops of Betadine and Vigamox were placed over the operative eye, as was a clear shield.  The patient returned to the recovery area in stable condition.              EFRA HDZ MD, FACS             D: 2018   T: 2018   MT: [id number]      Name:     HIMANSHU LORENZO   MRN:      [id number]        Account:        [id number]   :      1940           Procedure Date: 2018      Document: [id number]

## 2018-02-01 NOTE — ANESTHESIA POSTPROCEDURE EVALUATION
Patient: Josi Singh    Procedure(s):  LEFT EYE COMBINED PHACOEMULSIFICATION CLEAR CORNEA WITH STANDARD INTRAOCULAR LENS IMPLANT, ENDOSCOPIC CYCLOPHOTOCOAGULATION - Wound Class: I-Clean    Diagnosis:CATARACT  Diagnosis Additional Information: No value filed.    Anesthesia Type:  MAC    Note:  Anesthesia Post Evaluation    Patient location during evaluation: PACU  Patient participation: Able to fully participate in evaluation  Level of consciousness: awake and alert  Pain management: adequate  Airway patency: patent  Cardiovascular status: acceptable  Respiratory status: acceptable  Hydration status: acceptable  PONV: none     Anesthetic complications: None          Last vitals:  Vitals:    02/01/18 0857 02/01/18 0903 02/01/18 0927   BP: 103/64 102/68 112/68   Resp: 15 15 16   Temp:      SpO2: 94% 97% 98%         Electronically Signed By: Steve Sorto MD  February 1, 2018  10:03 AM

## 2018-02-01 NOTE — ANESTHESIA PREPROCEDURE EVALUATION
Anesthesia Evaluation     . Pt has had prior anesthetic.     No history of anesthetic complications          ROS/MED HX    ENT/Pulmonary:     (+)allergic rhinitis, , recent URI unresolved new dry cough, no fever or chills: . .   (-) tobacco use, asthma, COPD and sleep apnea   Neurologic:       Cardiovascular:     (+) hypertension--CAD (coronary calcifications), --. : . . . :. .       METS/Exercise Tolerance:     Hematologic:         Musculoskeletal: Comment: Low back pain  Compression fracture  osteoporosis        GI/Hepatic:        (-) GERD and liver disease   Renal/Genitourinary:      (-) renal disease   Endo:      (-) Type II DM and thyroid disease   Psychiatric:         Infectious Disease:         Malignancy:         Other: Comment: Glaucoma  Macular degeneration                    Physical Exam  Normal systems: cardiovascular, pulmonary and dental    Airway   Mallampati: II  TM distance: >3 FB  Neck ROM: full    Dental     Cardiovascular       Pulmonary                     Anesthesia Plan      History & Physical Review  History and physical reviewed and following examination; no interval change.    ASA Status:  2 .    NPO Status:  > 8 hours    Plan for MAC Reason for MAC:  Procedure to face, neck, head or breast  PONV prophylaxis:  Ondansetron (or other 5HT-3)  Propofol for sedation for retrobulbar block  Asked her to tell surgeon if she needs to cough      Postoperative Care  Postoperative pain management:  IV analgesics.      Consents  Anesthetic plan, risks, benefits and alternatives discussed with:  Patient..                          .

## 2018-02-01 NOTE — ANESTHESIA CARE TRANSFER NOTE
Patient: Josi Singh    Procedure(s):  LEFT EYE COMBINED PHACOEMULSIFICATION CLEAR CORNEA WITH STANDARD INTRAOCULAR LENS IMPLANT, ENDOSCOPIC CYCLOPHOTOCOAGULATION - Wound Class: I-Clean    Diagnosis: CATARACT  Diagnosis Additional Information: No value filed.    Anesthesia Type:   MAC     Note:  Airway :Room Air  Patient transferred to:PACU  Comments: Pt exhibits spont resps, all monitors and alarms on in pacu, report given to RN, vss.Handoff Report: Identifed the Patient, Identified the Reponsible Provider, Reviewed the pertinent medical history, Discussed the surgical course, Reviewed Intra-OP anesthesia mangement and issues during anesthesia, Set expectations for post-procedure period and Allowed opportunity for questions and acknowledgement of understanding      Vitals: (Last set prior to Anesthesia Care Transfer)    CRNA VITALS  2/1/2018 0811 - 2/1/2018 0849      2/1/2018             Pulse: 74    Ht Rate: 73    SpO2: 99 %    Resp Rate (set): 10                Electronically Signed By: ILDA Burgos CRNA  February 1, 2018  8:49 AM

## 2018-02-01 NOTE — BRIEF OP NOTE
[facility]  Ophthalmology Brief Operative Note    Pre-operative diagnosis: CATARACT   Post-operative diagnosis Same   Operative Eye Left   Procedure: Procedure(s):  COMBINED PHACOEMULSIFICATION CLEAR CORNEA WITH STANDARD INTRAOCULAR LENS IMPLANT, ENDOSCOPIC CYCLOPHOTOCOAGULATION   Surgeon: Yosvany Zamora MD   Anesthesia: Combined MAC with Retrobulbar    Estimated blood loss: Trace    Complications: None   Condition: Stable   Comments: See dictated operative report for full details         Implant Name Type Inv. Item Serial No.  Lot No. LRB No. Used   EYE IMP IOL JUWAN PCL TECNIS [id number] 20.5 Lens/Eye Implant EYE IMP IOL JUWAN PCL TECNIS ZCB00 20.5 [id number] [employer]   Left 1

## 2018-02-01 NOTE — IP AVS SNAPSHOT
[facility]    [address]    [place] [address]    Phone:  [phone]    Fax:  [phone]                                       After Visit Summary   2/1/2018    Josi Singh    MRN: [id number]           After Visit Summary Signature Page     I have received my discharge instructions, and my questions have been answered. I have discussed any challenges I see with this plan with the nurse or doctor.    ..........................................................................................................................................  Patient/Patient Representative Signature      ..........................................................................................................................................  Patient Representative Print Name and Relationship to Patient    ..................................................               ................................................  Date                                            Time    ..........................................................................................................................................  Reviewed by Signature/Title    ...................................................              ..............................................  Date                                                            Time

## 2018-02-01 NOTE — IP AVS SNAPSHOT
MRN:[id number]                      After Visit Summary   2/1/2018    Josi Singh    MRN: [id number]           Thank you!     Thank you for choosing [facility] for your care. Our goal is always to provide you with excellent care. Hearing back from our patients is one way we can continue to improve our services. Please take a few minutes to complete the written survey that you may receive in the mail after you visit with us. Thank you!        Patient Information     Date Of Birth          1940        About your hospital stay     You were admitted on:  February 1, 2018 You last received care in the:  [facility]    You were discharged on:  February 1, 2018       Who to Call     For medical emergencies, please call 911.  For non-urgent questions about your medical care, please call your primary care provider or clinic, [phone]  For questions related to your surgery, please call your surgery clinic        Attending Provider     Provider Specialty    Yosvany Zamora MD Ophthalmology       Primary Care Provider Office Phone # Fax #    Micaela PORTILLO Derek Marquez [phone]      Further instructions from your care team       [facility] Discharge  Instructions  Anesthesia ([facility] Center)   Adult Discharge Instructions    For 24 hours after surgery    1. Get plenty of rest.  Make arrangements to have a responsible adult stay with you for at least 6 hours after you leave the hospital.  2. Do not drive or use heavy equipment for 24 hours.    3. Do not drink alcohol for 24 hours.  4. Do not sign legal documents or make important decisions for 24 hours.  5. Avoid strenuous or risky activities. You may feel lightheaded.  If so, sit for a few minutes before standing.  Have someone help you get up.   6. Conscious sedation patients may resume a regular diet..  7. Any questions of medical nature, call your physician.    [facility]  "[facility]  Cataract Surgery Discharge Instructions  [facility] MD JAKE Rodríguez MD J. Hasan, MD C. Nichols, MD J. O'Neill, MD S. Schaefer, MD J. Stephens, MD        Start using drops when you arrive at home today    Vigamox or Ofloxacin (Tan top) - one drop in surgical eye 3 times  per day until gone.     Prednisolone acetate 1.0% (pink or white top) - one drop in surgical eye 3 times per day until gone.    Ketorolac or Diclofenac (Grey top) - one drop in surgical eye 3 times per day until gone.   (Dr. Zamora's patients should use all drops 4 times daily)       OR     You may have been prescribed SmartDrops or OneDrop, which is a compound formula drop that combines all three medications in a single drop. This drop should be instilled to the surgical eye 3 times daily until gone.        Place shield over surgical eye at bedtime for 3 nights.      The eye will feel itchy, scratchy, and vision will be blurred, you may take Tylenol for the scratchy feeling if this is bothersome.      No eye rubbing or swimming for I week.      You may resume all prescription medications as directed by your primary doctor.      Call if increasing pain, progressively worsening vision or worsening redness of surgical eye.      On-call doctor can be reached at [phone].    Pending Results     No orders found from 1/30/2018 to 2/2/2018.            Admission Information     Date & Time Provider Department Dept. Phone    2/1/2018 Yosvany Zamora MD [facility] [phone]      Your Vitals Were     Blood Pressure Temperature Respirations Height Weight Pulse Oximetry    97/64 97.7  F (36.5  C) (Temporal) 15 1.626 m (5' 4\") 76.2 kg (168 lb) 92%    BMI (Body Mass Index)                   28.84 kg/m2           MyChart Information     [facility] lets you send messages to your doctor, view your test results, renew your " "prescriptions, schedule appointments and more. To sign up, go to [email or web address] . Click on \"Log in\" on the left side of the screen, which will take you to the Welcome page. Then click on \"Sign up Now\" on the right side of the page.     You will be asked to enter the access code listed below, as well as some personal information. Please follow the directions to create your username and password.     Your access code is: [email or web address]  Expires: 3/14/2018  9:13 AM     Your access code will  in 90 days. If you need help or a new code, please call your [facility] clinic or [phone].        Care EveryWhere ID     This is your Care EveryWhere ID. This could be used by other organizations to access your [facility] medical records  [id number]        Equal Access to Services     ROB ARELLANO : Carlie Puri, aimee bowen, mina israel, zakia amezcua . So [place] [phone].    ATENCIÓN: Si habla español, tiene a gama disposición servicios gratuitos de asistencia lingüística. Alecia al [phone].    We comply with applicable federal civil rights laws and [place] laws. We do not discriminate on the basis of race, color, national origin, age, disability, sex, sexual orientation, or gender identity.               Review of your medicines      UNREVIEWED medicines. Ask your doctor about these medicines        Dose / Directions    ACETAMINOPHEN PO        Dose:  500 mg   Take 500 mg by mouth   Refills:  0       bimatoprost 0.01 % Soln   Commonly known as:  LUMIGAN        Dose:  1 drop   Place 1 drop into both eyes At Bedtime   Refills:  0       COSOPT 2-0.5 % ophthalmic solution   Generic drug:  dorzolamide-timolol        Dose:  1 drop   Place 1 drop into both eyes 2 times daily   Refills:  0       estradiol 0.1 MG/GM cream   Commonly known as:  ESTRACE        Dose:  2 g   Place 2 g vaginally three times a week   Refills:  0       fluticasone 50 MCG/ACT " spray   Commonly known as:  FLONASE        Dose:  1 spray   Spray 1 spray into both nostrils daily   Refills:  0       FOSAMAX PO        Dose:  70 mg   Take 70 mg by mouth once a week   Refills:  0       GABAPENTIN PO        Dose:  300 mg   Take 300 mg by mouth daily   Refills:  0       lisinopril-hydrochlorothiazide 20-12.5 MG per tablet   Commonly known as:  PRINZIDE/ZESTORETIC        Dose:  1 tablet   Take 1 tablet by mouth daily   Refills:  0                Protect others around you: Learn how to safely use, store and throw away your medicines at www.disposemymeds.org.             Medication List: This is a list of all your medications and when to take them. Check marks below indicate your daily home schedule. Keep this list as a reference.      Medications           Morning Afternoon Evening Bedtime As Needed    ACETAMINOPHEN PO   Take 500 mg by mouth                                bimatoprost 0.01 % Soln   Commonly known as:  LUMIGAN   Place 1 drop into both eyes At Bedtime                                COSOPT 2-0.5 % ophthalmic solution   Place 1 drop into both eyes 2 times daily   Generic drug:  dorzolamide-timolol                                estradiol 0.1 MG/GM cream   Commonly known as:  ESTRACE   Place 2 g vaginally three times a week                                fluticasone 50 MCG/ACT spray   Commonly known as:  FLONASE   Spray 1 spray into both nostrils daily                                FOSAMAX PO   Take 70 mg by mouth once a week                                GABAPENTIN PO   Take 300 mg by mouth daily                                lisinopril-hydrochlorothiazide 20-12.5 MG per tablet   Commonly known as:  PRINZIDE/ZESTORETIC   Take 1 tablet by mouth daily

## 2018-02-01 NOTE — DISCHARGE INSTRUCTIONS
[facility] Anesthesia [facility] Discharge  Instructions  Anesthesia ([facility] Care Center)   Adult Discharge Instructions    For 24 hours after surgery    1. Get plenty of rest.  Make arrangements to have a responsible adult stay with you for at least 6 hours after you leave the hospital.  2. Do not drive or use heavy equipment for 24 hours.    3. Do not drink alcohol for 24 hours.  4. Do not sign legal documents or make important decisions for 24 hours.  5. Avoid strenuous or risky activities. You may feel lightheaded.  If so, sit for a few minutes before standing.  Have someone help you get up.   6. Conscious sedation patients may resume a regular diet..  7. Any questions of medical nature, call your physician.    [facility]  Cataract Surgery Discharge Instructions  [facility] MD JAKE Rodríguez MD J. Hasan, MD C. Nichols, MD J. O'Neill, MD S. Schaefer, MD J. Stephens, MD        Start using drops when you arrive at home today    Vigamox or Ofloxacin (Tan top) - one drop in surgical eye 3 times  per day until gone.     Prednisolone acetate 1.0% (pink or white top) - one drop in surgical eye 3 times per day until gone.    Ketorolac or Diclofenac (Grey top) - one drop in surgical eye 3 times per day until gone.   (Dr. Zamora's patients should use all drops 4 times daily)       OR     You may have been prescribed SmartDrops or OneDrop, which is a compound formula drop that combines all three medications in a single drop. This drop should be instilled to the surgical eye 3 times daily until gone.        Place shield over surgical eye at bedtime for 3 nights.      The eye will feel itchy, scratchy, and vision will be blurred, you may take Tylenol for the scratchy feeling if this is bothersome.      No eye rubbing or swimming for I week.      You may resume all prescription medications as directed by your primary  doctor.      Call if increasing pain, progressively worsening vision or worsening redness of surgical eye.      On-call doctor can be reached at [phone].

## 2018-02-01 NOTE — OR NURSING
Printed and verbal instructions given to patient and family.  Patient/Family verbalized understanding. No knowledge deficit noted.  Implant card and (4) eye drop/s given to patient/care taker. Belongings returned to patient/family. Patient assisted to a wheel chair and was  brought to the hospital door by  volunteer service  for discharged to home.

## 2019-06-19 ENCOUNTER — OFFICE VISIT (OUTPATIENT)
Dept: FAMILY MEDICINE | Facility: OTHER | Age: 79
End: 2019-06-19
Attending: PHYSICIAN ASSISTANT
Payer: MEDICARE

## 2019-06-19 VITALS
BODY MASS INDEX: 29.63 KG/M2 | RESPIRATION RATE: 18 BRPM | TEMPERATURE: 97.6 F | WEIGHT: 172.6 LBS | HEART RATE: 68 BPM | DIASTOLIC BLOOD PRESSURE: 92 MMHG | SYSTOLIC BLOOD PRESSURE: 138 MMHG

## 2019-06-19 DIAGNOSIS — N39.0 ACUTE UTI: Primary | ICD-10-CM

## 2019-06-19 DIAGNOSIS — R39.9 UTI SYMPTOMS: ICD-10-CM

## 2019-06-19 LAB
ALBUMIN UR-MCNC: 30 MG/DL
APPEARANCE UR: CLEAR
BACTERIA #/AREA URNS HPF: ABNORMAL /HPF
BILIRUB UR QL STRIP: NEGATIVE
COLOR UR AUTO: YELLOW
GLUCOSE UR STRIP-MCNC: NEGATIVE MG/DL
HGB UR QL STRIP: ABNORMAL
KETONES UR STRIP-MCNC: NEGATIVE MG/DL
LEUKOCYTE ESTERASE UR QL STRIP: ABNORMAL
NITRATE UR QL: POSITIVE
NON-SQ EPI CELLS #/AREA URNS LPF: ABNORMAL /LPF
PH UR STRIP: 6 PH (ref 5–9)
RBC #/AREA URNS AUTO: ABNORMAL /HPF
SOURCE: ABNORMAL
SP GR UR STRIP: 1.01 (ref 1–1.03)
UROBILINOGEN UR STRIP-ACNC: 0.2 EU/DL (ref 0.2–1)
WBC #/AREA URNS AUTO: >100 /HPF

## 2019-06-19 PROCEDURE — G0463 HOSPITAL OUTPT CLINIC VISIT: HCPCS

## 2019-06-19 PROCEDURE — 87086 URINE CULTURE/COLONY COUNT: CPT | Mod: ZL | Performed by: PHYSICIAN ASSISTANT

## 2019-06-19 PROCEDURE — 99213 OFFICE O/P EST LOW 20 MIN: CPT | Performed by: PHYSICIAN ASSISTANT

## 2019-06-19 PROCEDURE — 81001 URINALYSIS AUTO W/SCOPE: CPT | Mod: ZL | Performed by: PHYSICIAN ASSISTANT

## 2019-06-19 PROCEDURE — 87077 CULTURE AEROBIC IDENTIFY: CPT | Mod: ZL | Performed by: PHYSICIAN ASSISTANT

## 2019-06-19 RX ORDER — CLINDAMYCIN HCL 300 MG
CAPSULE ORAL
Refills: 2 | COMMUNITY
Start: 2019-04-20 | End: 2020-08-08

## 2019-06-19 RX ORDER — CIPROFLOXACIN 250 MG/1
250 TABLET, FILM COATED ORAL 2 TIMES DAILY
Qty: 14 TABLET | Refills: 0 | Status: SHIPPED | OUTPATIENT
Start: 2019-06-19 | End: 2019-06-26

## 2019-06-19 RX ORDER — DIPHENOXYLATE HYDROCHLORIDE AND ATROPINE SULFATE 2.5; .025 MG/1; MG/1
1 TABLET ORAL
COMMUNITY
Start: 2019-01-07

## 2019-06-19 NOTE — NURSING NOTE
Chief Complaint   Patient presents with     Urinary Problem         Medication Reconciliation: complete    Maci Pinon, LPN

## 2019-06-19 NOTE — PROGRESS NOTES
Nursing Notes:   Maci Pinon LPN  6/19/2019  9:24 AM  Signed  Chief Complaint   Patient presents with     Urinary Problem         Medication Reconciliation: complete    Maci Pinon LPN      HPI:    Josi Singh is a 78 year old female who presents for urinary symptoms.  Symptoms have been gradually coming on for 1.5 weeks. Dysuria.  Urinary frequency.  Hx bladder cancer.  Patient follows with oncology.  She is nervous today as she does not want her bladder cancer to come back.  No fevers, chills, belly pain, back pain.  No blood in her stool or urine.    Past Medical History:   Diagnosis Date     Allergic rhinitis      Closed dislocation, multiple and ill-defined sites      Compression fracture of spine (H)      Coronary artery disease      Degeneration of intervertebral disc, site unspecified      Disorder of bone and cartilage, unspecified      Essential hypertension, benign      Glaucoma of both eyes      Macular degeneration      Other abnormal clinical finding      Other and unspecified hyperlipidemia      Sprain and strain of unspecified site of knee and leg      Vitamin D deficiency        Past Surgical History:   Procedure Laterality Date     C NONSPECIFIC PROCEDURE  1988    rt shoulder dislocation and fx     C NONSPECIFIC PROCEDURE  1971    TL     GYN SURGERY      tubal     ORTHOPEDIC SURGERY Right     LYSSA     PHACOEMULSIFICATION CLEAR CORNEA W/ STANDARD IOL IMPLANT, ENDOSCOPIC CYCLOPHOTOCOAGULATION, COMBINED Right 12/14/2017    Procedure: COMBINED PHACOEMULSIFICATION CLEAR CORNEA WITH STANDARD INTRAOCULAR LENS IMPLANT, ENDOSCOPIC CYCLOPHOTOCOAGULATION;  RIGHT EYE COMBINED PHACOEMULSIFICATION CLEAR CORNEA WITH STANDARD INTRAOCULAR LENS IMPLANT, ENDOSCOPIC CYCLOPHOTOCOAGULATION ;  Surgeon: Yosvany Zamora MD;  Location: [facility]     PHACOEMULSIFICATION CLEAR CORNEA W/ STANDARD IOL IMPLANT, ENDOSCOPIC CYCLOPHOTOCOAGULATION, COMBINED Left 2/1/2018    Procedure: COMBINED  PHACOEMULSIFICATION CLEAR CORNEA WITH STANDARD INTRAOCULAR LENS IMPLANT, ENDOSCOPIC CYCLOPHOTOCOAGULATION;  LEFT EYE COMBINED PHACOEMULSIFICATION CLEAR CORNEA WITH STANDARD INTRAOCULAR LENS IMPLANT, ENDOSCOPIC CYCLOPHOTOCOAGULATION;  Surgeon: Yosvany Zamora MD;  Location: [facility]       History reviewed. No pertinent family history.    Social History     Tobacco Use     Smoking status: Never Smoker     Smokeless tobacco: Never Used   Substance Use Topics     Alcohol use: Yes     Comment: occ       Current Outpatient Medications   Medication Sig Dispense Refill     ACETAMINOPHEN PO Take 500 mg by mouth       Alendronate Sodium (FOSAMAX PO) Take 70 mg by mouth once a week       bimatoprost (LUMIGAN) 0.01 % SOLN Place 1 drop into both eyes At Bedtime       Calcium 250 MG CAPS        calcium-vitamin D 500-125 MG-UNIT TABS        ciprofloxacin (CIPRO) 250 MG tablet Take 1 tablet (250 mg) by mouth 2 times daily for 7 days 14 tablet 0     clindamycin (CLEOCIN) 300 MG capsule TK 2 C 1 HOUR PRIOR TO APPT  2     dorzolamide-timolol (COSOPT) 2-0.5 % ophthalmic solution Place 1 drop into both eyes 2 times daily       fluticasone (FLONASE) 50 MCG/ACT spray Spray 1 spray into both nostrils daily       GABAPENTIN PO Take 300 mg by mouth daily       lisinopril-hydrochlorothiazide (PRINZIDE/ZESTORETIC) 20-12.5 MG per tablet Take 1 tablet by mouth daily       Multiple Vitamin (MULTI-VITAMINS) TABS Take 1 tablet by mouth         No Known Allergies    REVIEW OF SYSTEMS:  Refer to HPI.    EXAM:   Vitals:    BP (!) 138/92 (BP Location: Right arm, Patient Position: Sitting, Cuff Size: Adult Regular)   Pulse 68   Temp 97.6  F (36.4  C)   Resp 18   Wt 78.3 kg (172 lb 9.6 oz)   BMI 29.63 kg/m      General Appearance: Pleasant, alert, appropriate appearance for age. No acute distress  Chest/Respiratory Exam: Normal chest wall and respirations. Clear to auscultation.  Cardiovascular Exam: Regular rate and rhythm. S1, S2, no murmur,  click, gallop, or rubs.  Gastrointestinal Exam: Soft, no masses or organomegaly. Abdomen non-tender. Normal BS x 4. No suprapubic tenderness. No CVA tenderness to palpation. No rebound tenderness or guarding.  Psychiatric Exam: Alert and oriented - appropriate affect.    PHQDepression Screen  No flowsheet data found.    Labs:   Results for orders placed or performed in visit on 06/19/19   Urinalysis w Reflex Microscopic If Positive   Result Value Ref Range    Color Urine Yellow     Appearance Urine Clear     Glucose Urine Negative NEG^Negative mg/dL    Bilirubin Urine Negative NEG^Negative    Ketones Urine Negative NEG^Negative mg/dL    Specific Gravity Urine 1.015 1.000 - 1.030    Blood Urine Small (A) NEG^Negative    pH Urine 6.0 5.0 - 9.0 pH    Protein Albumin Urine 30 (A) NEG^Negative mg/dL    Urobilinogen Urine 0.2 0.2 - 1.0 EU/dL    Nitrite Urine Positive (A) NEG^Negative    Leukocyte Esterase Urine Moderate (A) NEG^Negative    Source Urine    Urine Microscopic   Result Value Ref Range    WBC Urine >100 (A) OTO5^0 - 5 /HPF    RBC Urine 10-25 (A) OTO2^O - 2 /HPF    Squamous Epithelial /LPF Urine Few FEW^Few /LPF    Bacteria Urine Many (A) NEG^Negative /HPF   Urine Culture Aerobic Bacterial   Result Value Ref Range    Specimen Description Midstream Urine     Culture Micro >100,000 colonies/mL  Escherichia coli   (A)        Susceptibility    Escherichia coli - JESUS     AMPICILLIN <=8 Sensitive ug/mL     AZTREONAM <=8 Sensitive ug/mL     CEFEPIME <=8 Sensitive ug/mL     CEFTRIAXONE <=8 Sensitive ug/mL     CEFTAZIDIME <=1 Sensitive ug/mL     CEFOTAXIME <=2 Sensitive ug/mL     CIPROFLOXACIN <=1 Sensitive ug/mL     CEFUROXIME <=4 Sensitive ug/mL     NITROFURANTOIN <=32 Sensitive ug/mL     GENTAMICIN <=4 Sensitive ug/mL     IMIPENEM <=1 Sensitive ug/mL     LEVOFLOXACIN <=2 Sensitive ug/mL     Piperacillin/Tazo <=16 Sensitive ug/mL     Trimethoprim/Sulfa <=2/38 Sensitive ug/mL     TETRACYCLINE <=4 Sensitive ug/mL        ASSESSMENT AND PLAN:      ICD-10-CM    1. Acute UTI N39.0 Urine Culture Aerobic Bacterial     ciprofloxacin (CIPRO) 250 MG tablet   2. UTI symptoms R39.9 Urinalysis w Reflex Microscopic If Positive     Urine Microscopic       Patient has nitrites, urinary leukocytes and hematuria appreciated on her urinalysis.  Urine culture is positive for E. coli bacteria.  Patient was started ciprofloxacin 250 mg twice daily for 7 days for an acute UTI treatment.  Encourage repeat urinalysis/urine culture in 2 weeks for monitoring to ensure the infection has resolved.    Antibiotic has been sent to pharmacy. Please take full course of antibiotic even if symptoms have completely resolved. This helps prevent against antibiotic resistance.     We will culture the urine to see what bacteria grows out of the urine.  We will call the patient if a change of antibiotic is necessary per the culture.  Patient was instructed in increase fluids including water and cranberry juice.  Monitor for fevers, chills, back pain.  Return to clinic after antibiotic completion if symptoms are not resolved.  Call clinic if symptoms change/worsen.      Patient Instructions   Antibiotic has been sent to pharmacy. Please take full course of antibiotic even if symptoms have completely resolved. This helps prevent against antibiotic resistance.     We will culture the urine to see what bacteria grows out of the urine.  We will call the patient if a change of antibiotic is necessary per the culture.  Patient was instructed in increase fluids including water and cranberry juice.  Monitor for fevers, chills, back pain.  Return to clinic after antibiotic completion if symptoms are not resolved.  Call clinic if symptoms change/worsen.            Hamida Proctor PA-C..................6/19/2019 9:03 AM

## 2019-06-19 NOTE — PATIENT INSTRUCTIONS
Antibiotic has been sent to pharmacy. Please take full course of antibiotic even if symptoms have completely resolved. This helps prevent against antibiotic resistance.     We will culture the urine to see what bacteria grows out of the urine.  We will call the patient if a change of antibiotic is necessary per the culture.  Patient was instructed in increase fluids including water and cranberry juice.  Monitor for fevers, chills, back pain.  Return to clinic after antibiotic completion if symptoms are not resolved.  Call clinic if symptoms change/worsen.

## 2019-06-21 ENCOUNTER — TELEPHONE (OUTPATIENT)
Dept: FAMILY MEDICINE | Facility: OTHER | Age: 79
End: 2019-06-21

## 2019-06-21 DIAGNOSIS — N39.0 ACUTE UTI: Primary | ICD-10-CM

## 2019-06-21 NOTE — TELEPHONE ENCOUNTER
She would a phone call regarding the final results. I did discuss preliminary result of urine culture and told her to continue with current antibiotics.

## 2019-06-21 NOTE — TELEPHONE ENCOUNTER
Pt calling regarding urinary culture results.  Pt is leaving sometime on Monday for [place], would like to get verbal results before she leaves.  Please call pt.

## 2019-06-22 LAB
BACTERIA SPEC CULT: ABNORMAL
SPECIMEN SOURCE: ABNORMAL

## 2019-06-24 NOTE — TELEPHONE ENCOUNTER
Spoke with patient who confirmed her last name and birth date, informed her of Hamida Proctor NP note and transferred her to scheduling to make a lab only apt.  Elma Ohara LPN 6/24/2019 1:09 PM

## 2019-06-24 NOTE — TELEPHONE ENCOUNTER
Your urine culture grew out E. coli bacteria indicating that you have a bladder infection.  Please complete the full course of the ciprofloxacin antibiotic for treatment.  I would recommend completing a repeat urinalysis in 2 weeks to ensure that the infection has resolved.  Lab orders have been placed.  Please schedule lab only appointment in 2 weeks to give a repeat urine sample.  Please return sooner if symptoms have not resolved or worsening.  Hamida Proctor PA-C.......... 6/24/2019 11:11 AM

## 2019-06-24 NOTE — TELEPHONE ENCOUNTER
Attempted to contact patient, no answer and unable to leave a message.  Elma Ohara LPN 6/24/2019 11:35 AM

## 2020-08-08 ENCOUNTER — OFFICE VISIT (OUTPATIENT)
Dept: FAMILY MEDICINE | Facility: OTHER | Age: 80
End: 2020-08-08
Attending: NURSE PRACTITIONER
Payer: MEDICARE

## 2020-08-08 VITALS
OXYGEN SATURATION: 95 % | RESPIRATION RATE: 19 BRPM | SYSTOLIC BLOOD PRESSURE: 146 MMHG | BODY MASS INDEX: 28.97 KG/M2 | DIASTOLIC BLOOD PRESSURE: 88 MMHG | HEART RATE: 60 BPM | WEIGHT: 168.8 LBS | TEMPERATURE: 97.5 F

## 2020-08-08 DIAGNOSIS — H57.89 IRRITATION OF LEFT EYE: Primary | ICD-10-CM

## 2020-08-08 PROCEDURE — 99203 OFFICE O/P NEW LOW 30 MIN: CPT | Performed by: NURSE PRACTITIONER

## 2020-08-08 PROCEDURE — G0463 HOSPITAL OUTPT CLINIC VISIT: HCPCS

## 2020-08-08 RX ORDER — ERYTHROMYCIN 5 MG/G
0.5 OINTMENT OPHTHALMIC
Qty: 3.5 G | Refills: 0 | Status: SHIPPED | OUTPATIENT
Start: 2020-08-08 | End: 2020-08-15

## 2020-08-08 SDOH — HEALTH STABILITY: MENTAL HEALTH: HOW OFTEN DO YOU HAVE A DRINK CONTAINING ALCOHOL?: MONTHLY OR LESS

## 2020-08-08 ASSESSMENT — PAIN SCALES - GENERAL: PAINLEVEL: MILD PAIN (3)

## 2020-08-08 NOTE — PROGRESS NOTES
HPI:    Josi Singh is a 79 year old female who presents to clinic today for left eye irritation.  She reports she woke up this morning and felt like she has sand or gravel in her left eye.  She is wondering if she is got his stye developing.  She tried to rinse this with saline which was not helpful.  No drainage that she has noted.  She has not had any trauma to her eye.  Does not wear contacts.    Past Medical History:   Diagnosis Date     Allergic rhinitis      Closed dislocation, multiple and ill-defined sites      Compression fracture of spine (H)      Coronary artery disease      Degeneration of intervertebral disc, site unspecified      Disorder of bone and cartilage, unspecified      Essential hypertension, benign      Glaucoma of both eyes      Macular degeneration      Other abnormal clinical finding      Other and unspecified hyperlipidemia      Sprain and strain of unspecified site of knee and leg      Vitamin D deficiency          Current Outpatient Medications   Medication Sig Dispense Refill     bimatoprost (LUMIGAN) 0.01 % SOLN Place 1 drop into both eyes At Bedtime       Calcium 250 MG CAPS        dorzolamide-timolol (COSOPT) 2-0.5 % ophthalmic solution Place 1 drop into both eyes 2 times daily       erythromycin (ROMYCIN) 5 MG/GM ophthalmic ointment Place 0.5 inches Into the left eye 5 times daily for 7 days Patient will  at drive through right away 3.5 g 0     fluticasone (FLONASE) 50 MCG/ACT spray Spray 1 spray into both nostrils daily       GABAPENTIN PO Take 300 mg by mouth daily       lisinopril-hydrochlorothiazide (PRINZIDE/ZESTORETIC) 20-12.5 MG per tablet Take 1 tablet by mouth daily       Multiple Vitamin (MULTI-VITAMINS) TABS Take 1 tablet by mouth         No Known Allergies    ROS:  Pertinent positives and negatives are noted in HPI.    EXAM:  BP (!) 146/88 (BP Location: Right arm, Patient Position: Sitting, Cuff Size: Adult Regular)   Pulse 60   Temp 97.5  F (36.4  C)  (Tympanic)   Resp 19   Wt 76.6 kg (168 lb 12.8 oz)   SpO2 95%   BMI 28.97 kg/m    General appearance: well appearing female, in no acute distress  Head: normocephalic, atraumatic  Eyes: Left conjunctive are mildly injected, tetracaine was applied and evaluated with fluorescein.  No corneal lesions noted, no styes noted  Oropharynx: moist mucous membranes  Psychological: normal affect, alert and pleasant    ASSESSMENT AND PLAN:    1. Irritation of left eye      Irritation of left eye, no corneal abrasion appreciated.  Discussed that this could be early stye versus allergies versus viral infection.  Will treat with Romycin ointment for comfort over the next 5 to 7 days.  Follow-up if symptoms progress or worsen.    Blanca Camejo, ILDA CNP..................8/8/2020 8:18 AM      This document was prepared using voice generated software.  While every attempt was made for accuracy, grammatical errors may exist.

## 2020-08-08 NOTE — NURSING NOTE
"Chief Complaint   Patient presents with     Eye Problem     feels something in left eye     Patient presents to clinic with discomfort in left eye. She state she feels there is something stuck in it. Tried to use eye drops to rinse it out and it didn't provide relief, still feels as if something is stuck in it.     Initial BP (!) 146/88 (BP Location: Right arm, Patient Position: Sitting, Cuff Size: Adult Regular)   Pulse 60   Temp 97.5  F (36.4  C) (Tympanic)   Resp 19   Wt 76.6 kg (168 lb 12.8 oz)   SpO2 95%   BMI 28.97 kg/m   Estimated body mass index is 28.97 kg/m  as calculated from the following:    Height as of 2/1/18: 1.626 m (5' 4\").    Weight as of this encounter: 76.6 kg (168 lb 12.8 oz).    Medication Reconciliation: complete      Oksana Galvan  "

## 2020-08-08 NOTE — PROGRESS NOTES
Blanca Camejo used tetracaine hydrochloride 4mL and a fluorescein sodium strip for patient today.   Oksana Galvan LPN.......  8/8/2020  11:36 AM

## 2020-10-12 ENCOUNTER — HOSPITAL ENCOUNTER (EMERGENCY)
Facility: OTHER | Age: 80
Discharge: HOME | End: 2020-10-13
Attending: STUDENT IN AN ORGANIZED HEALTH CARE EDUCATION/TRAINING PROGRAM | Admitting: STUDENT IN AN ORGANIZED HEALTH CARE EDUCATION/TRAINING PROGRAM
Payer: MEDICARE

## 2020-10-12 DIAGNOSIS — I10 SEVERE HYPERTENSION: ICD-10-CM

## 2020-10-12 DIAGNOSIS — R04.0 EPISTAXIS: ICD-10-CM

## 2020-10-12 PROCEDURE — 99283 EMERGENCY DEPT VISIT LOW MDM: CPT | Performed by: STUDENT IN AN ORGANIZED HEALTH CARE EDUCATION/TRAINING PROGRAM

## 2020-10-12 PROCEDURE — 99284 EMERGENCY DEPT VISIT MOD MDM: CPT | Performed by: STUDENT IN AN ORGANIZED HEALTH CARE EDUCATION/TRAINING PROGRAM

## 2020-10-12 PROCEDURE — 99284 EMERGENCY DEPT VISIT MOD MDM: CPT | Mod: 25,27 | Performed by: STUDENT IN AN ORGANIZED HEALTH CARE EDUCATION/TRAINING PROGRAM

## 2020-10-12 PROCEDURE — 250N000011 HC RX IP 250 OP 636: Performed by: STUDENT IN AN ORGANIZED HEALTH CARE EDUCATION/TRAINING PROGRAM

## 2020-10-12 PROCEDURE — 96374 THER/PROPH/DIAG INJ IV PUSH: CPT | Performed by: STUDENT IN AN ORGANIZED HEALTH CARE EDUCATION/TRAINING PROGRAM

## 2020-10-12 RX ORDER — OXYMETAZOLINE HYDROCHLORIDE 0.05 G/100ML
2 SPRAY NASAL 2 TIMES DAILY
Status: DISCONTINUED | OUTPATIENT
Start: 2020-10-13 | End: 2020-10-13 | Stop reason: HOSPADM

## 2020-10-12 RX ORDER — LABETALOL HYDROCHLORIDE 5 MG/ML
20 INJECTION, SOLUTION INTRAVENOUS ONCE
Status: COMPLETED | OUTPATIENT
Start: 2020-10-12 | End: 2020-10-12

## 2020-10-12 RX ORDER — LABETALOL 100 MG/1
100 TABLET, FILM COATED ORAL ONCE
Status: DISCONTINUED | OUTPATIENT
Start: 2020-10-12 | End: 2020-10-12

## 2020-10-12 RX ADMIN — LABETALOL HYDROCHLORIDE 20 MG: 5 INJECTION INTRAVENOUS at 23:36

## 2020-10-12 ASSESSMENT — MIFFLIN-ST. JEOR: SCORE: 1170.76

## 2020-10-12 NOTE — ED AVS SNAPSHOT
[facility]  [address]  [place] [address]  Phone: [phone]  Fax: [phone]                                    Josi Singh   MRN: [id number]    Department: [facility]   Date of Visit: 10/12/2020           After Visit Summary Signature Page    I have received my discharge instructions, and my questions have been answered. I have discussed any challenges I see with this plan with the nurse or doctor.    ..........................................................................................................................................  Patient/Patient Representative Signature      ..........................................................................................................................................  Patient Representative Print Name and Relationship to Patient    ..................................................               ................................................  Date                                   Time    ..........................................................................................................................................  Reviewed by Signature/Title    ...................................................              ..............................................  Date                                               Time          22EPIC Rev 08/18

## 2020-10-13 ENCOUNTER — HOSPITAL ENCOUNTER (EMERGENCY)
Facility: OTHER | Age: 80
Discharge: HOME | End: 2020-10-14
Attending: FAMILY MEDICINE | Admitting: FAMILY MEDICINE
Payer: MEDICARE

## 2020-10-13 ENCOUNTER — APPOINTMENT (OUTPATIENT)
Dept: GENERAL RADIOLOGY | Facility: OTHER | Age: 80
End: 2020-10-13
Attending: PHYSICIAN ASSISTANT
Payer: MEDICARE

## 2020-10-13 ENCOUNTER — HOSPITAL ENCOUNTER (EMERGENCY)
Facility: OTHER | Age: 80
Discharge: HOME | End: 2020-10-13
Attending: PHYSICIAN ASSISTANT | Admitting: PHYSICIAN ASSISTANT
Payer: MEDICARE

## 2020-10-13 VITALS
OXYGEN SATURATION: 98 % | SYSTOLIC BLOOD PRESSURE: 115 MMHG | HEART RATE: 103 BPM | RESPIRATION RATE: 16 BRPM | TEMPERATURE: 96 F | BODY MASS INDEX: 27.31 KG/M2 | HEIGHT: 64 IN | DIASTOLIC BLOOD PRESSURE: 93 MMHG | WEIGHT: 160 LBS

## 2020-10-13 VITALS
TEMPERATURE: 97.7 F | OXYGEN SATURATION: 96 % | BODY MASS INDEX: 27.31 KG/M2 | HEIGHT: 64 IN | DIASTOLIC BLOOD PRESSURE: 66 MMHG | HEART RATE: 58 BPM | SYSTOLIC BLOOD PRESSURE: 112 MMHG | WEIGHT: 160 LBS | RESPIRATION RATE: 13 BRPM

## 2020-10-13 DIAGNOSIS — F41.9 ANXIETY: ICD-10-CM

## 2020-10-13 DIAGNOSIS — I16.0 HYPERTENSIVE URGENCY: ICD-10-CM

## 2020-10-13 DIAGNOSIS — I10 BENIGN ESSENTIAL HYPERTENSION: ICD-10-CM

## 2020-10-13 DIAGNOSIS — E87.6 HYPOKALEMIA: ICD-10-CM

## 2020-10-13 LAB
ALBUMIN SERPL-MCNC: 3.9 G/DL (ref 3.5–5.7)
ALBUMIN UR-MCNC: NEGATIVE MG/DL
ALP SERPL-CCNC: 40 U/L (ref 34–104)
ALT SERPL W P-5'-P-CCNC: 15 U/L (ref 7–52)
ANION GAP SERPL CALCULATED.3IONS-SCNC: 8 MMOL/L (ref 3–14)
APPEARANCE UR: CLEAR
AST SERPL W P-5'-P-CCNC: 16 U/L (ref 13–39)
BASOPHILS # BLD AUTO: 0 10E9/L (ref 0–0.2)
BASOPHILS NFR BLD AUTO: 0.2 %
BILIRUB SERPL-MCNC: 0.8 MG/DL (ref 0.3–1)
BILIRUB UR QL STRIP: NEGATIVE
BUN SERPL-MCNC: 10 MG/DL (ref 7–25)
CALCIUM SERPL-MCNC: 9.3 MG/DL (ref 8.6–10.3)
CHLORIDE SERPL-SCNC: 105 MMOL/L (ref 98–107)
CO2 SERPL-SCNC: 29 MMOL/L (ref 21–31)
COLOR UR AUTO: NORMAL
CREAT SERPL-MCNC: 0.74 MG/DL (ref 0.6–1.2)
CRP SERPL-MCNC: 2.9 MG/L
D DIMER PPP FEU-MCNC: 0.5 UG/ML FEU (ref 0–0.5)
DIFFERENTIAL METHOD BLD: ABNORMAL
EOSINOPHIL # BLD AUTO: 0 10E9/L (ref 0–0.7)
EOSINOPHIL NFR BLD AUTO: 0.5 %
ERYTHROCYTE [DISTWIDTH] IN BLOOD BY AUTOMATED COUNT: 12.5 % (ref 10–15)
GFR SERPL CREATININE-BSD FRML MDRD: 75 ML/MIN/{1.73_M2}
GLUCOSE SERPL-MCNC: 108 MG/DL (ref 70–105)
GLUCOSE UR STRIP-MCNC: NEGATIVE MG/DL
HCT VFR BLD AUTO: 43 % (ref 35–47)
HGB BLD-MCNC: 14.3 G/DL (ref 11.7–15.7)
HGB UR QL STRIP: NEGATIVE
IMM GRANULOCYTES # BLD: 0 10E9/L (ref 0–0.4)
IMM GRANULOCYTES NFR BLD: 0.2 %
INR PPP: 0.99 (ref 0.86–1.14)
INTERPRETATION ECG - MUSE: NORMAL
KETONES UR STRIP-MCNC: NEGATIVE MG/DL
LACTATE BLD-SCNC: 1.1 MMOL/L (ref 0.7–2)
LEUKOCYTE ESTERASE UR QL STRIP: NEGATIVE
LYMPHOCYTES # BLD AUTO: 0.7 10E9/L (ref 0.8–5.3)
LYMPHOCYTES NFR BLD AUTO: 7.7 %
MCH RBC QN AUTO: 31.8 PG (ref 26.5–33)
MCHC RBC AUTO-ENTMCNC: 33.3 G/DL (ref 31.5–36.5)
MCV RBC AUTO: 96 FL (ref 78–100)
MONOCYTES # BLD AUTO: 0.7 10E9/L (ref 0–1.3)
MONOCYTES NFR BLD AUTO: 8.5 %
NEUTROPHILS # BLD AUTO: 7 10E9/L (ref 1.6–8.3)
NEUTROPHILS NFR BLD AUTO: 82.9 %
NITRATE UR QL: NEGATIVE
NT-PROBNP SERPL-MCNC: 372 PG/ML (ref 0–100)
PH UR STRIP: 6.5 PH (ref 5–7)
PLATELET # BLD AUTO: 255 10E9/L (ref 150–450)
POTASSIUM SERPL-SCNC: 3 MMOL/L (ref 3.5–5.1)
PROT SERPL-MCNC: 6.3 G/DL (ref 6.4–8.9)
RBC # BLD AUTO: 4.5 10E12/L (ref 3.8–5.2)
SODIUM SERPL-SCNC: 142 MMOL/L (ref 134–144)
SOURCE: NORMAL
SP GR UR STRIP: 1.01 (ref 1–1.03)
TROPONIN I SERPL-MCNC: 6.5 PG/ML
TSH SERPL DL<=0.05 MIU/L-ACNC: 1.56 IU/ML (ref 0.34–5.6)
UROBILINOGEN UR STRIP-MCNC: NORMAL MG/DL (ref 0–2)
WBC # BLD AUTO: 8.4 10E9/L (ref 4–11)

## 2020-10-13 PROCEDURE — 36415 COLL VENOUS BLD VENIPUNCTURE: CPT | Performed by: PHYSICIAN ASSISTANT

## 2020-10-13 PROCEDURE — 86140 C-REACTIVE PROTEIN: CPT | Performed by: PHYSICIAN ASSISTANT

## 2020-10-13 PROCEDURE — 99284 EMERGENCY DEPT VISIT MOD MDM: CPT | Mod: 25 | Performed by: PHYSICIAN ASSISTANT

## 2020-10-13 PROCEDURE — 99283 EMERGENCY DEPT VISIT LOW MDM: CPT | Performed by: PHYSICIAN ASSISTANT

## 2020-10-13 PROCEDURE — 250N000013 HC RX MED GY IP 250 OP 250 PS 637: Performed by: PHYSICIAN ASSISTANT

## 2020-10-13 PROCEDURE — 93005 ELECTROCARDIOGRAM TRACING: CPT | Performed by: PHYSICIAN ASSISTANT

## 2020-10-13 PROCEDURE — 85379 FIBRIN DEGRADATION QUANT: CPT | Performed by: PHYSICIAN ASSISTANT

## 2020-10-13 PROCEDURE — 71046 X-RAY EXAM CHEST 2 VIEWS: CPT

## 2020-10-13 PROCEDURE — 99284 EMERGENCY DEPT VISIT MOD MDM: CPT | Performed by: PHYSICIAN ASSISTANT

## 2020-10-13 PROCEDURE — 84484 ASSAY OF TROPONIN QUANT: CPT | Performed by: PHYSICIAN ASSISTANT

## 2020-10-13 PROCEDURE — 84443 ASSAY THYROID STIM HORMONE: CPT | Performed by: PHYSICIAN ASSISTANT

## 2020-10-13 PROCEDURE — 96374 THER/PROPH/DIAG INJ IV PUSH: CPT | Performed by: PHYSICIAN ASSISTANT

## 2020-10-13 PROCEDURE — 83880 ASSAY OF NATRIURETIC PEPTIDE: CPT | Mod: GZ | Performed by: PHYSICIAN ASSISTANT

## 2020-10-13 PROCEDURE — 81003 URINALYSIS AUTO W/O SCOPE: CPT | Performed by: PHYSICIAN ASSISTANT

## 2020-10-13 PROCEDURE — 99285 EMERGENCY DEPT VISIT HI MDM: CPT | Mod: 25 | Performed by: PHYSICIAN ASSISTANT

## 2020-10-13 PROCEDURE — 93010 ELECTROCARDIOGRAM REPORT: CPT | Performed by: INTERNAL MEDICINE

## 2020-10-13 PROCEDURE — 80053 COMPREHEN METABOLIC PANEL: CPT | Performed by: PHYSICIAN ASSISTANT

## 2020-10-13 PROCEDURE — 85610 PROTHROMBIN TIME: CPT | Performed by: PHYSICIAN ASSISTANT

## 2020-10-13 PROCEDURE — 83605 ASSAY OF LACTIC ACID: CPT | Performed by: PHYSICIAN ASSISTANT

## 2020-10-13 PROCEDURE — 85025 COMPLETE CBC W/AUTO DIFF WBC: CPT | Performed by: PHYSICIAN ASSISTANT

## 2020-10-13 RX ORDER — CLONIDINE HYDROCHLORIDE 0.1 MG/1
0.2 TABLET ORAL ONCE
Status: COMPLETED | OUTPATIENT
Start: 2020-10-13 | End: 2020-10-13

## 2020-10-13 RX ORDER — LISINOPRIL AND HYDROCHLOROTHIAZIDE 20; 25 MG/1; MG/1
1 TABLET ORAL DAILY
Qty: 10 TABLET | Refills: 0 | Status: SHIPPED | OUTPATIENT
Start: 2020-10-13 | End: 2020-10-13

## 2020-10-13 RX ORDER — CLONIDINE HYDROCHLORIDE 0.1 MG/1
0.1 TABLET ORAL ONCE
Status: COMPLETED | OUTPATIENT
Start: 2020-10-13 | End: 2020-10-13

## 2020-10-13 RX ORDER — POTASSIUM CHLORIDE 1500 MG/1
40 TABLET, EXTENDED RELEASE ORAL ONCE
Status: COMPLETED | OUTPATIENT
Start: 2020-10-13 | End: 2020-10-13

## 2020-10-13 RX ORDER — LISINOPRIL AND HYDROCHLOROTHIAZIDE 20; 25 MG/1; MG/1
1 TABLET ORAL DAILY
Qty: 30 TABLET | Refills: 0 | Status: SHIPPED | OUTPATIENT
Start: 2020-10-13

## 2020-10-13 RX ORDER — POTASSIUM CHLORIDE 1500 MG/1
20 TABLET, EXTENDED RELEASE ORAL 2 TIMES DAILY
Qty: 20 TABLET | Refills: 0 | Status: SHIPPED | OUTPATIENT
Start: 2020-10-13 | End: 2020-10-23

## 2020-10-13 RX ADMIN — POTASSIUM CHLORIDE 40 MEQ: 1500 TABLET, EXTENDED RELEASE ORAL at 12:13

## 2020-10-13 RX ADMIN — CLONIDINE HYDROCHLORIDE 0.1 MG: 0.1 TABLET ORAL at 12:14

## 2020-10-13 RX ADMIN — CLONIDINE HYDROCHLORIDE 0.2 MG: 0.1 TABLET ORAL at 10:45

## 2020-10-13 ASSESSMENT — ENCOUNTER SYMPTOMS
LIGHT-HEADEDNESS: 0
WHEEZING: 0
BRUISES/BLEEDS EASILY: 0
FACIAL ASYMMETRY: 0
SEIZURES: 0
SHORTNESS OF BREATH: 0
SORE THROAT: 0
FATIGUE: 0
DIARRHEA: 0
NECK PAIN: 0
DIZZINESS: 0
NAUSEA: 0
NECK STIFFNESS: 0
BACK PAIN: 0
SPEECH DIFFICULTY: 0
VOMITING: 0
COUGH: 0
TREMORS: 0
ABDOMINAL PAIN: 0
HEADACHES: 1
WEAKNESS: 0
STRIDOR: 0
ACTIVITY CHANGE: 0
COLOR CHANGE: 0
DYSURIA: 0
EYE PAIN: 0
TROUBLE SWALLOWING: 0
CHEST TIGHTNESS: 0
FEVER: 0
SHORTNESS OF BREATH: 0
CONSTIPATION: 0
PSYCHIATRIC NEGATIVE: 1
LIGHT-HEADEDNESS: 0
FREQUENCY: 0
APPETITE CHANGE: 0
FACIAL SWELLING: 0
RHINORRHEA: 0

## 2020-10-13 ASSESSMENT — MIFFLIN-ST. JEOR
SCORE: 1170.76
SCORE: 1170.76

## 2020-10-13 NOTE — ED PROVIDER NOTES
History     Chief Complaint   Patient presents with     Epistaxis       HPI   Josi Singh is a 80 year old female who presents with epistaxis.  Reports unprovoked spontaneous bleed of her right nostril lasting roughly 1 hour which resolved by the time she arrived.  Denies any trauma, nose picking, nose blowing, bleeding disorders, frequent NSAID use.  Blood pressure she says is typically well controlled and she did take her blood pressure medication today.  Denies lightheadedness, significant amount of blood going down her oropharynx.  No recent history of epistaxis, but did have one nosebleed years ago.      No Known Allergies    There are no active problems to display for this patient.      Past Medical History:   Diagnosis Date     Allergic rhinitis      Closed dislocation, multiple and ill-defined sites      Compression fracture of spine (H)      Coronary artery disease      Degeneration of intervertebral disc, site unspecified      Disorder of bone and cartilage, unspecified      Essential hypertension, benign      Glaucoma of both eyes      Macular degeneration      Other abnormal clinical finding      Other and unspecified hyperlipidemia      Sprain and strain of unspecified site of knee and leg      Vitamin D deficiency        Past Surgical History:   Procedure Laterality Date     GYN SURGERY      tubal     ORTHOPEDIC SURGERY Right     LYSSA     PHACOEMULSIFICATION CLEAR CORNEA W/ STANDARD IOL IMPLANT, ENDOSCOPIC CYCLOPHOTOCOAGULATION, COMBINED Right 12/14/2017    Procedure: COMBINED PHACOEMULSIFICATION CLEAR CORNEA WITH STANDARD INTRAOCULAR LENS IMPLANT, ENDOSCOPIC CYCLOPHOTOCOAGULATION;  RIGHT EYE COMBINED PHACOEMULSIFICATION CLEAR CORNEA WITH STANDARD INTRAOCULAR LENS IMPLANT, ENDOSCOPIC CYCLOPHOTOCOAGULATION ;  Surgeon: Yosvany Zamora MD;  Location: [facility]     PHACOEMULSIFICATION CLEAR CORNEA W/ STANDARD IOL IMPLANT, ENDOSCOPIC CYCLOPHOTOCOAGULATION, COMBINED Left 2/1/2018    Procedure: COMBINED  "PHACOEMULSIFICATION CLEAR CORNEA WITH STANDARD INTRAOCULAR LENS IMPLANT, ENDOSCOPIC CYCLOPHOTOCOAGULATION;  LEFT EYE COMBINED PHACOEMULSIFICATION CLEAR CORNEA WITH STANDARD INTRAOCULAR LENS IMPLANT, ENDOSCOPIC CYCLOPHOTOCOAGULATION;  Surgeon: Yosvany Zamora MD;  Location:  [facility]     ZZC NONSPECIFIC PROCEDURE  1988    rt shoulder dislocation and fx     ZZC NONSPECIFIC PROCEDURE  1971    TL       History reviewed. No pertinent family history.    Social History     Tobacco Use     Smoking status: Never Smoker     Smokeless tobacco: Never Used   Substance Use Topics     Alcohol use: Yes     Frequency: Monthly or less     Comment: occ     Drug use: No       Medications:         bimatoprost (LUMIGAN) 0.01 % SOLN       Calcium 250 MG CAPS       dorzolamide-timolol (COSOPT) 2-0.5 % ophthalmic solution       fluticasone (FLONASE) 50 MCG/ACT spray       GABAPENTIN PO       lisinopril-hydrochlorothiazide (ZESTORETIC) 20-25 MG tablet       Multiple Vitamin (MULTI-VITAMINS) TABS        Review of Systems   HENT: Positive for nosebleeds.    Respiratory: Negative for shortness of breath.    Neurological: Negative for light-headedness.   Hematological: Does not bruise/bleed easily.   Psychiatric/Behavioral: Negative.        Physical Exam   BP (!) 115/93   Pulse 103   Temp 96  F (35.6  C) (Tympanic)   Resp 16   Ht 1.626 m (5' 4\")   Wt 72.6 kg (160 lb)   SpO2 98%   BMI 27.46 kg/m       Physical Exam  Constitutional:       Appearance: Normal appearance.   HENT:      Head: Normocephalic and atraumatic.      Right Ear: External ear normal.      Left Ear: External ear normal.      Nose:      Comments: Small clot within right nasal passage over the anterior septum / Kiesselbach's plexus.  Neurological:      Mental Status: She is alert.         ED Course      No results found for this or any previous visit (from the past 24 hour(s)).    Medications   labetalol (NORMODYNE/TRANDATE) injection 20 mg (20 mg Intravenous Given " 10/12/20 2319)       Assessments & Plan (with Medical Decision Making)     I have reviewed the nursing notes.    Patient evaluated for epistaxis.  Nosebleed resolved by time she arrived in the ER.  Exam with small clot within the right nasal passage over the anterior septum/Kiesselbach's plexus. Vitals with severe hypertension up to 214/102.  Patient given IV labetalol 20 mg with reduction to 179/98.  She is provided with 2 dose clips and Afrin spray and instructions for use of these 2 items.  Her home lisinopril hydrochlorothiazide was increased from 20-12.5 mg to 20-25 mg with instruction to fill this new prescription if her blood pressure is above 180/110 tomorrow morning.  Recommend close follow-up with her PCP for hypertension management.  Discharged home in stable condition with return precautions discussed.    I have reviewed the findings, diagnosis, plan and need for any follow up with the patient.    Discharge Medication List as of 10/13/2020  1:07 AM      START taking these medications    Details   lisinopril-hydrochlorothiazide (ZESTORETIC) 20-25 MG tablet Take 1 tablet by mouth daily for 10 days, Disp-10 tablet, R-0, E-Prescribe             Final diagnoses:   Epistaxis   Severe hypertension       10/12/2020   [facility]     Rafal Douglas MD  10/13/20 5000

## 2020-10-13 NOTE — ED AVS SNAPSHOT
[facility]  [address]  [place] [address]  Phone: [phone]  Fax: [phone]                                    Josi Singh   MRN: [id number]    Department: [facility]   Date of Visit: 10/13/2020           After Visit Summary Signature Page    I have received my discharge instructions, and my questions have been answered. I have discussed any challenges I see with this plan with the nurse or doctor.    ..........................................................................................................................................  Patient/Patient Representative Signature      ..........................................................................................................................................  Patient Representative Print Name and Relationship to Patient    ..................................................               ................................................  Date                                   Time    ..........................................................................................................................................  Reviewed by Signature/Title    ...................................................              ..............................................  Date                                               Time          22EPIC Rev 08/18

## 2020-10-13 NOTE — ED AVS SNAPSHOT
[facility]  [address]  [place] [address]  Phone: [phone]  Fax: [phone]                                    Josi Singh   MRN: [id number]    Department: [facility]   Date of Visit: 10/13/2020           After Visit Summary Signature Page    I have received my discharge instructions, and my questions have been answered. I have discussed any challenges I see with this plan with the nurse or doctor.    ..........................................................................................................................................  Patient/Patient Representative Signature      ..........................................................................................................................................  Patient Representative Print Name and Relationship to Patient    ..................................................               ................................................  Date                                   Time    ..........................................................................................................................................  Reviewed by Signature/Title    ...................................................              ..............................................  Date                                               Time          22EPIC Rev 08/18

## 2020-10-13 NOTE — ED TRIAGE NOTES
Patient presenting to the ER via private car from home with your niece. Patient states she was in the ER last night for a nose bleed and HTN. She normally takes Lisinopril at home. Last evening she was sent home with a prescription for HTN for lisinopril-Hydrochlorothizide and has not picked this up yet from the pharmacy. BP this AM at home  204/109.

## 2020-10-13 NOTE — ED TRIAGE NOTES
To ER by private car with a nose bleed that started about 45 minutes ago and unable to stop it. No hx of same. No blood thinners. GCS = 15 , no pain.

## 2020-10-13 NOTE — ED PROVIDER NOTES
History     Chief Complaint   Patient presents with     Hypertension     HPI  Josi Singh is a 80 year old female who was seen last night for an epistaxis.  It was noted that time that she had hypertension and she was started on a prescription of lisinopril and hydrochlorothiazide.  She has not picked up this prescription but notices that her blood pressure continues to be elevated.  She denies any chest pain or shortness of breath.  She has had increased headaches over the past few weeks.  No nausea or vomiting.  Denies any lightheadedness or dizziness.  No sore throat, cough, or flulike symptoms.  No diarrhea or constipation.  Patient reports to me that she would like to be thoroughly evaluated and would like to establish care locally.  She currently is permanent address in [place] but has a cabin appears well.    Allergies:  No Known Allergies    Problem List:    There are no active problems to display for this patient.       Past Medical History:    Past Medical History:   Diagnosis Date     Allergic rhinitis      Closed dislocation, multiple and ill-defined sites      Compression fracture of spine (H)      Coronary artery disease      Degeneration of intervertebral disc, site unspecified      Disorder of bone and cartilage, unspecified      Essential hypertension, benign      Glaucoma of both eyes      Macular degeneration      Other abnormal clinical finding      Other and unspecified hyperlipidemia      Sprain and strain of unspecified site of knee and leg      Vitamin D deficiency        Past Surgical History:    Past Surgical History:   Procedure Laterality Date     GYN SURGERY      tubal     ORTHOPEDIC SURGERY Right     LYSSA     PHACOEMULSIFICATION CLEAR CORNEA W/ STANDARD IOL IMPLANT, ENDOSCOPIC CYCLOPHOTOCOAGULATION, COMBINED Right 12/14/2017    Procedure: COMBINED PHACOEMULSIFICATION CLEAR CORNEA WITH STANDARD INTRAOCULAR LENS IMPLANT, ENDOSCOPIC CYCLOPHOTOCOAGULATION;  RIGHT EYE COMBINED  PHACOEMULSIFICATION CLEAR CORNEA WITH STANDARD INTRAOCULAR LENS IMPLANT, ENDOSCOPIC CYCLOPHOTOCOAGULATION ;  Surgeon: Yosvany Zamora MD;  [facility]:  [facility]     PHACOEMULSIFICATION CLEAR CORNEA W/ STANDARD IOL IMPLANT, ENDOSCOPIC CYCLOPHOTOCOAGULATION, COMBINED Left 2/1/2018    Procedure: COMBINED PHACOEMULSIFICATION CLEAR CORNEA WITH STANDARD INTRAOCULAR LENS IMPLANT, ENDOSCOPIC CYCLOPHOTOCOAGULATION;  LEFT EYE COMBINED PHACOEMULSIFICATION CLEAR CORNEA WITH STANDARD INTRAOCULAR LENS IMPLANT, ENDOSCOPIC CYCLOPHOTOCOAGULATION;  Surgeon: Yosvany Zamora MD;  Location:  [facility]     ZZC NONSPECIFIC PROCEDURE  1988    rt shoulder dislocation and fx     ZZC NONSPECIFIC PROCEDURE  1971    TL       Family History:    History reviewed. No pertinent family history.    Social History:  Marital Status:   [5]  Social History     Tobacco Use     Smoking status: Never Smoker     Smokeless tobacco: Never Used   Substance Use Topics     Alcohol use: Yes     Frequency: Monthly or less     Comment: occ     Drug use: No        Medications:         bimatoprost (LUMIGAN) 0.01 % SOLN       Calcium 250 MG CAPS       dorzolamide-timolol (COSOPT) 2-0.5 % ophthalmic solution       fluticasone (FLONASE) 50 MCG/ACT spray       GABAPENTIN PO       lisinopril-hydrochlorothiazide (ZESTORETIC) 20-25 MG tablet       Multiple Vitamin (MULTI-VITAMINS) TABS          Review of Systems   Constitutional: Negative for activity change, appetite change, fatigue and fever.   HENT: Negative for drooling, facial swelling, rhinorrhea, sore throat and trouble swallowing.    Eyes: Negative for pain and visual disturbance.   Respiratory: Negative for cough, chest tightness, shortness of breath, wheezing and stridor.    Cardiovascular: Negative for chest pain and leg swelling.   Gastrointestinal: Negative for abdominal pain, constipation, diarrhea, nausea and vomiting.   Genitourinary: Negative for dysuria, frequency and urgency.   Musculoskeletal:  "Negative for back pain, neck pain and neck stiffness.   Skin: Negative for color change.   Neurological: Positive for headaches. Negative for dizziness, tremors, seizures, facial asymmetry, speech difficulty, weakness and light-headedness.       Physical Exam   BP: (!) 202/110  Pulse: 70  Temp: 97.7  F (36.5  C)  Resp: 18  Height: 162.6 cm (5' 4\")  Weight: 72.6 kg (160 lb)  SpO2: 96 %    Vitals:    10/13/20 1245 10/13/20 1300 10/13/20 1315 10/13/20 1330   BP: 119/74 117/71 104/64 112/66   Pulse: 61 59 59 58   Resp:    13   Temp:       TempSrc:       SpO2: 96% 97% 95% 96%   Weight:       Height:             Physical Exam  Constitutional:       General: She is not in acute distress.     Appearance: She is not ill-appearing, toxic-appearing or diaphoretic.   HENT:      Head: No raccoon eyes or Avelar's sign.      Jaw: No trismus.      Right Ear: No drainage or tenderness.      Left Ear: No drainage or tenderness.      Nose: Nose normal.   Eyes:      General: No scleral icterus.     Extraocular Movements: Extraocular movements intact.      Right eye: Normal extraocular motion and no nystagmus.      Left eye: Normal extraocular motion and no nystagmus.      Pupils: Pupils are equal, round, and reactive to light.      Right eye: Pupil is reactive and not sluggish.      Left eye: Pupil is reactive and not sluggish.      Funduscopic exam:     Right eye: No AV nicking, arteriolar narrowing or papilledema. Red reflex present.         Left eye: No AV nicking, arteriolar narrowing or papilledema. Red reflex present.  Neck:      Musculoskeletal: Normal range of motion. Normal range of motion. No neck rigidity, pain with movement, spinous process tenderness or muscular tenderness.      Vascular: No JVD.      Trachea: No tracheal deviation.   Cardiovascular:      Rate and Rhythm: Normal rate and regular rhythm.      Pulses: Normal pulses.      Heart sounds: Normal heart sounds. No murmur. No friction rub. No gallop.    Pulmonary: "      Effort: Pulmonary effort is normal. No respiratory distress.      Breath sounds: Normal breath sounds. No stridor. No wheezing.      Comments: Lung sounds are clear.  SaO2 is 96% on room air.  She does not appear to be in any respiratory distress.  No tachypnea.  Abdominal:      General: There is no distension.      Palpations: There is no mass.      Tenderness: There is no abdominal tenderness. There is no right CVA tenderness, left CVA tenderness, guarding or rebound.   Musculoskeletal: Normal range of motion.         General: No tenderness or deformity.   Lymphadenopathy:      Cervical: No cervical adenopathy.      Right cervical: No superficial cervical adenopathy.     Left cervical: No superficial cervical adenopathy.   Skin:     General: Skin is warm and dry.      Capillary Refill: Capillary refill takes less than 2 seconds.   Neurological:      Mental Status: She is alert and oriented to person, place, and time.      GCS: GCS eye subscore is 4. GCS verbal subscore is 5. GCS motor subscore is 6.      Motor: No tremor or seizure activity.      Coordination: Coordination normal.      Gait: Gait normal.         ED Course     EKG is normal sinus rhythm with left axis deviation heart rate is 65.    Results for orders placed or performed during the hospital encounter of 10/13/20 (from the past 24 hour(s))   Comprehensive metabolic panel   Result Value Ref Range    Sodium 142 134 - 144 mmol/L    Potassium 3.0 (L) 3.5 - 5.1 mmol/L    Chloride 105 98 - 107 mmol/L    Carbon Dioxide 29 21 - 31 mmol/L    Anion Gap 8 3 - 14 mmol/L    Glucose 108 (H) 70 - 105 mg/dL    Urea Nitrogen 10 7 - 25 mg/dL    Creatinine 0.74 0.60 - 1.20 mg/dL    GFR Estimate 75 >60 mL/min/[1.73_m2]    GFR Estimate If Black >90 >60 mL/min/[1.73_m2]    Calcium 9.3 8.6 - 10.3 mg/dL    Bilirubin Total 0.8 0.3 - 1.0 mg/dL    Albumin 3.9 3.5 - 5.7 g/dL    Protein Total 6.3 (L) 6.4 - 8.9 g/dL    Alkaline Phosphatase 40 34 - 104 U/L    ALT 15 7 - 52  U/L    AST 16 13 - 39 U/L   CBC with platelets differential   Result Value Ref Range    WBC 8.4 4.0 - 11.0 10e9/L    RBC Count 4.50 3.8 - 5.2 10e12/L    Hemoglobin 14.3 11.7 - 15.7 g/dL    Hematocrit 43.0 35.0 - 47.0 %    MCV 96 78 - 100 fl    MCH 31.8 26.5 - 33.0 pg    MCHC 33.3 31.5 - 36.5 g/dL    RDW 12.5 10.0 - 15.0 %    Platelet Count 255 150 - 450 10e9/L    Diff Method Automated Method     % Neutrophils 82.9 %    % Lymphocytes 7.7 %    % Monocytes 8.5 %    % Eosinophils 0.5 %    % Basophils 0.2 %    % Immature Granulocytes 0.2 %    Absolute Neutrophil 7.0 1.6 - 8.3 10e9/L    Absolute Lymphocytes 0.7 (L) 0.8 - 5.3 10e9/L    Absolute Monocytes 0.7 0.0 - 1.3 10e9/L    Absolute Eosinophils 0.0 0.0 - 0.7 10e9/L    Absolute Basophils 0.0 0.0 - 0.2 10e9/L    Abs Immature Granulocytes 0.0 0 - 0.4 10e9/L   D dimer quantitative   Result Value Ref Range    D Dimer 0.5 0.0 - 0.50 ug/ml FEU   INR   Result Value Ref Range    INR 0.99 0.86 - 1.14   Troponin GH   Result Value Ref Range    Troponin 6.5 <34.0 pg/mL   Lactic acid whole blood   Result Value Ref Range    Lactic Acid 1.1 0.7 - 2.0 mmol/L   TSH Reflex GH   Result Value Ref Range    TSH Reflex 1.56 0.34 - 5.60 IU/mL   Nt probnp inpatient (BNP)   Result Value Ref Range    N-Terminal Pro BNP Inpatient 372 (H) 0 - 100 pg/mL   CRP inflammation   Result Value Ref Range    CRP Inflammation 2.9 <10.0 mg/L   XR Chest 2 Views    Narrative    PROCEDURE:  XR CHEST 2 VW    HISTORY: htn, .    COMPARISON:  None.    FINDINGS:  The cardiomediastinal contours are normal.  The trachea is midline.   There is calcific aortic atherosclerosis.  No focal consolidation, effusion or pneumothorax.    There is osteoporosis and age indeterminant vertebral body height loss  at approximately T10, L1 and L2.      Impression    IMPRESSION:      No acute cardiopulmonary process.      Osteoporosis and age indeterminant vertebral body height loss at  approximately T10, L1 and L2.    ELENITA  MD MARGIE   UA reflex to Microscopic   Result Value Ref Range    Color Urine Light Yellow     Appearance Urine Clear     Glucose Urine Negative NEG^Negative mg/dL    Bilirubin Urine Negative NEG^Negative    Ketones Urine Negative NEG^Negative mg/dL    Specific Gravity Urine 1.010 1.003 - 1.035    Blood Urine Negative NEG^Negative    pH Urine 6.5 5.0 - 7.0 pH    Protein Albumin Urine Negative NEG^Negative mg/dL    Urobilinogen mg/dL Normal 0.0 - 2.0 mg/dL    Nitrite Urine Negative NEG^Negative    Leukocyte Esterase Urine Negative NEG^Negative    Source Midstream Urine        Medications   cloNIDine (CATAPRES) tablet 0.2 mg (0.2 mg Oral Given 10/13/20 1045)   cloNIDine (CATAPRES) tablet 0.1 mg (0.1 mg Oral Given 10/13/20 1214)   potassium chloride ER (KLOR-CON M) CR tablet 40 mEq (40 mEq Oral Given 10/13/20 1213)       Assessments & Plan (with Medical Decision Making)     I have reviewed the nursing notes.    I have reviewed the findings, diagnosis, plan and need for follow up with the patient.      Discharge Medication List as of 10/13/2020  1:38 PM      START taking these medications    Details   potassium chloride ER (K-TAB) 20 MEQ CR tablet Take 1 tablet (20 mEq) by mouth 2 times daily for 10 days, Disp-20 tablet, R-0, Local Print         Zestoretic 20-25 mg.  1 tablet p.o. daily #30 no refills    Final diagnoses:   Hypertensive urgency   Hypokalemia   Afebrile.  Vital signs show hypertensive urgency with initial blood pressure 202/110.  She was given oral Catapres.  Patient was in the ER last night with epistaxis and started on Zestoretic she has not filled this prescription yet.  She is here today looking for further evaluation.  She would like to also establish care locally.  [place]  EKG is normal sinus rhythm with left axis deviation heart rate is 65.  Troponin is normal.  D-dimer is normal.  Her BNP is slightly elevated at 372.  CBC shows normal white blood cells  and no left shift.  CRP is normal.  TSH is normal.  Lactic acid is normal.  INR is normal.  CMP shows minimal decrease in potassium at 3.0 but otherwise unremarkable.  She was given oral replacement.  Her UA shows no signs of UTI.  Chest x-ray is unremarkable.  Gradually with time her blood pressure decreased to acceptable levels upon discharge her blood pressure is 112/66.  She is feeling much better.  I discussed continued monitoring and return if there is any concerns for further evaluation.  Rx was written for potassium chloride and instructions for increasing her potassium in her diet were given as well.  Rx for Zestoretic x30 days.  Follow-up arranged with Suzette Stephenson on Wednesday, 10/21/2020 at 9 AM to establish care and for further medical management.  10/13/2020   [facility] AND [facility]     Chau Escalera PA-C  10/13/20 4556

## 2020-10-13 NOTE — DISCHARGE INSTRUCTIONS
As discussed, if you develop another nosebleed use the nose clip over the front part of your nose and lean forward for approximately 15 minutes and then remove clip and assess.  If necessary try this again for 15 minutes.  If you still have nosebleed you can try using the Afrin spray.  For your hypertension, check your blood pressure tomorrow morning and if >180/100 start taking the new stronger dose hypertension medication we are prescribing a short course of.  Please follow-up with your PCP to be assessed for blood pressure control.  Thanks for your patience tonight.

## 2020-10-14 VITALS
HEIGHT: 64 IN | HEART RATE: 67 BPM | OXYGEN SATURATION: 96 % | WEIGHT: 160 LBS | BODY MASS INDEX: 27.31 KG/M2 | TEMPERATURE: 96.1 F | DIASTOLIC BLOOD PRESSURE: 93 MMHG | SYSTOLIC BLOOD PRESSURE: 174 MMHG | RESPIRATION RATE: 18 BRPM

## 2020-10-14 LAB
ANION GAP SERPL CALCULATED.3IONS-SCNC: 6 MMOL/L (ref 3–14)
BASOPHILS # BLD AUTO: 0 10E9/L (ref 0–0.2)
BASOPHILS NFR BLD AUTO: 0.3 %
BUN SERPL-MCNC: 14 MG/DL (ref 7–25)
CALCIUM SERPL-MCNC: 9.4 MG/DL (ref 8.6–10.3)
CHLORIDE SERPL-SCNC: 102 MMOL/L (ref 98–107)
CO2 SERPL-SCNC: 29 MMOL/L (ref 21–31)
CREAT SERPL-MCNC: 0.77 MG/DL (ref 0.6–1.2)
DIFFERENTIAL METHOD BLD: ABNORMAL
EOSINOPHIL # BLD AUTO: 0.1 10E9/L (ref 0–0.7)
EOSINOPHIL NFR BLD AUTO: 1.4 %
ERYTHROCYTE [DISTWIDTH] IN BLOOD BY AUTOMATED COUNT: 12.7 % (ref 10–15)
GFR SERPL CREATININE-BSD FRML MDRD: 72 ML/MIN/{1.73_M2}
GLUCOSE SERPL-MCNC: 117 MG/DL (ref 70–105)
HCT VFR BLD AUTO: 39.8 % (ref 35–47)
HGB BLD-MCNC: 13.2 G/DL (ref 11.7–15.7)
IMM GRANULOCYTES # BLD: 0 10E9/L (ref 0–0.4)
IMM GRANULOCYTES NFR BLD: 0.2 %
LYMPHOCYTES # BLD AUTO: 0.7 10E9/L (ref 0.8–5.3)
LYMPHOCYTES NFR BLD AUTO: 11 %
MCH RBC QN AUTO: 32.3 PG (ref 26.5–33)
MCHC RBC AUTO-ENTMCNC: 33.2 G/DL (ref 31.5–36.5)
MCV RBC AUTO: 97 FL (ref 78–100)
MONOCYTES # BLD AUTO: 0.8 10E9/L (ref 0–1.3)
MONOCYTES NFR BLD AUTO: 11.8 %
NEUTROPHILS # BLD AUTO: 4.8 10E9/L (ref 1.6–8.3)
NEUTROPHILS NFR BLD AUTO: 75.3 %
PLATELET # BLD AUTO: 237 10E9/L (ref 150–450)
POTASSIUM SERPL-SCNC: 3.2 MMOL/L (ref 3.5–5.1)
RBC # BLD AUTO: 4.09 10E12/L (ref 3.8–5.2)
SODIUM SERPL-SCNC: 137 MMOL/L (ref 134–144)
WBC # BLD AUTO: 6.4 10E9/L (ref 4–11)

## 2020-10-14 PROCEDURE — 36415 COLL VENOUS BLD VENIPUNCTURE: CPT | Performed by: FAMILY MEDICINE

## 2020-10-14 PROCEDURE — 96374 THER/PROPH/DIAG INJ IV PUSH: CPT | Performed by: PHYSICIAN ASSISTANT

## 2020-10-14 PROCEDURE — 85025 COMPLETE CBC W/AUTO DIFF WBC: CPT | Performed by: FAMILY MEDICINE

## 2020-10-14 PROCEDURE — 80048 BASIC METABOLIC PNL TOTAL CA: CPT | Performed by: FAMILY MEDICINE

## 2020-10-14 PROCEDURE — 250N000011 HC RX IP 250 OP 636: Performed by: FAMILY MEDICINE

## 2020-10-14 PROCEDURE — 250N000013 HC RX MED GY IP 250 OP 250 PS 637: Mod: GY | Performed by: FAMILY MEDICINE

## 2020-10-14 RX ORDER — LORAZEPAM 2 MG/ML
0.5 INJECTION INTRAMUSCULAR ONCE
Status: COMPLETED | OUTPATIENT
Start: 2020-10-14 | End: 2020-10-14

## 2020-10-14 RX ORDER — POTASSIUM CHLORIDE 1500 MG/1
40 TABLET, EXTENDED RELEASE ORAL ONCE
Status: COMPLETED | OUTPATIENT
Start: 2020-10-14 | End: 2020-10-14

## 2020-10-14 RX ADMIN — LORAZEPAM 0.5 MG: 2 INJECTION, SOLUTION INTRAMUSCULAR; INTRAVENOUS at 00:17

## 2020-10-14 RX ADMIN — POTASSIUM CHLORIDE 40 MEQ: 1500 TABLET, EXTENDED RELEASE ORAL at 01:22

## 2020-10-14 ASSESSMENT — ENCOUNTER SYMPTOMS
CHILLS: 0
WEAKNESS: 0
EYE PAIN: 0
DYSURIA: 0
CONFUSION: 0
NAUSEA: 0
SPEECH DIFFICULTY: 0
COUGH: 0
NECK PAIN: 0
PHOTOPHOBIA: 0
EYE REDNESS: 0
NUMBNESS: 0
SHORTNESS OF BREATH: 0
HEADACHES: 0
PALPITATIONS: 0
VOMITING: 0
COLOR CHANGE: 0
LIGHT-HEADEDNESS: 0
SORE THROAT: 0
BRUISES/BLEEDS EASILY: 0
TROUBLE SWALLOWING: 0
FEVER: 0
ABDOMINAL PAIN: 0
FACIAL ASYMMETRY: 0
DIARRHEA: 0
BACK PAIN: 0

## 2020-10-14 NOTE — DISCHARGE INSTRUCTIONS
Take your blood pressure medications as prescribed starting in the morning.  Follow-up with your primary care physician to see how this is working for you.  Check your blood pressure only once a day at the same time every day.

## 2020-10-14 NOTE — ED PROVIDER NOTES
History     Chief Complaint   Patient presents with     Hypertension     HPI  Josi Singh is a 80 year old female who returns to the emergency room for evaluation of hypertension.  Patient was seen last night in the emergency room for a nosebleed and during that visit it was appreciated that she had high blood pressure.  She was started on lisinopril and hydrochlorothiazide.  The patient had not picked up her prescription but has been taking her blood pressure every hour and noticed that it is consistently going up.  She was then seen again last evening for similar complaints.  She was given a prescription for Zestoretic and once again has not picked that up.  She is here with her niece for evaluation of high blood pressure.  She states that she had an episode of blurry vision while watching TV tonight is completely resolved.  She denies any headache, focal neurologic symptoms, facial droop, difficulty finding or speaking words, fever, chills, URI type symptoms, sore throat, cough, chest pain, shortness of breath, abdominal pain, nausea, vomiting with diarrhea, dysuria, lightheadedness or dizziness.  She would like to establish care locally.  She currently has a permanent address in [place] but also has a cabin in the area up here as well.      Allergies:  No Known Allergies    Problem List:    There are no active problems to display for this patient.       Past Medical History:    Past Medical History:   Diagnosis Date     Allergic rhinitis      Closed dislocation, multiple and ill-defined sites      Compression fracture of spine (H)      Coronary artery disease      Degeneration of intervertebral disc, site unspecified      Disorder of bone and cartilage, unspecified      Essential hypertension, benign      Glaucoma of both eyes      Macular degeneration      Other abnormal clinical finding      Other and unspecified hyperlipidemia      Sprain and strain of unspecified site of knee and leg      Vitamin D  deficiency        Past Surgical History:    Past Surgical History:   Procedure Laterality Date     GYN SURGERY      tubal     ORTHOPEDIC SURGERY Right     LYSSA     PHACOEMULSIFICATION CLEAR CORNEA W/ STANDARD IOL IMPLANT, ENDOSCOPIC CYCLOPHOTOCOAGULATION, COMBINED Right 12/14/2017    Procedure: COMBINED PHACOEMULSIFICATION CLEAR CORNEA WITH STANDARD INTRAOCULAR LENS IMPLANT, ENDOSCOPIC CYCLOPHOTOCOAGULATION;  RIGHT EYE COMBINED PHACOEMULSIFICATION CLEAR CORNEA WITH STANDARD INTRAOCULAR LENS IMPLANT, ENDOSCOPIC CYCLOPHOTOCOAGULATION ;  Surgeon: Yosvany Zamora MD;  Location:  [facility]     PHACOEMULSIFICATION CLEAR CORNEA W/ STANDARD IOL IMPLANT, ENDOSCOPIC CYCLOPHOTOCOAGULATION, COMBINED Left 2/1/2018    Procedure: COMBINED PHACOEMULSIFICATION CLEAR CORNEA WITH STANDARD INTRAOCULAR LENS IMPLANT, ENDOSCOPIC CYCLOPHOTOCOAGULATION;  LEFT EYE COMBINED PHACOEMULSIFICATION CLEAR CORNEA WITH STANDARD INTRAOCULAR LENS IMPLANT, ENDOSCOPIC CYCLOPHOTOCOAGULATION;  Surgeon: Yosvany Zamora MD;  Location:  [facility]     ZZC NONSPECIFIC PROCEDURE  1988    rt shoulder dislocation and fx     ZZC NONSPECIFIC PROCEDURE  1971    TL       Family History:    History reviewed. No pertinent family history.    Social History:  Marital Status:   [5]  Social History     Tobacco Use     Smoking status: Never Smoker     Smokeless tobacco: Never Used   Substance Use Topics     Alcohol use: Yes     Frequency: Monthly or less     Comment: occ     Drug use: No        Medications:         bimatoprost (LUMIGAN) 0.01 % SOLN       Calcium 250 MG CAPS       dorzolamide-timolol (COSOPT) 2-0.5 % ophthalmic solution       GABAPENTIN PO       lisinopril-hydrochlorothiazide (ZESTORETIC) 20-25 MG tablet       Multiple Vitamin (MULTI-VITAMINS) TABS       potassium chloride ER (K-TAB) 20 MEQ CR tablet       fluticasone (FLONASE) 50 MCG/ACT spray          Review of Systems   Constitutional: Negative for chills and fever.   HENT: Negative for  "congestion, sore throat and trouble swallowing.    Eyes: Positive for visual disturbance. Negative for photophobia, pain and redness.   Respiratory: Negative for cough and shortness of breath.    Cardiovascular: Negative for chest pain, palpitations and leg swelling.   Gastrointestinal: Negative for abdominal pain, diarrhea, nausea and vomiting.   Genitourinary: Negative for dysuria.   Musculoskeletal: Negative for back pain and neck pain.   Skin: Negative for color change.   Neurological: Negative for facial asymmetry, speech difficulty, weakness, light-headedness, numbness and headaches.   Hematological: Does not bruise/bleed easily.   Psychiatric/Behavioral: Negative for confusion.   All other systems reviewed and are negative.      Physical Exam   BP: (!) 182/87  Pulse: 66  Temp: 96.1  F (35.6  C)  Resp: 16  Height: 162.6 cm (5' 4\")  Weight: 72.6 kg (160 lb)  SpO2: 96 %      Physical Exam  Vitals signs and nursing note reviewed.   Constitutional:       General: She is not in acute distress.     Appearance: Normal appearance. She is well-developed and overweight. She is not ill-appearing, toxic-appearing or diaphoretic.   HENT:      Head: Normocephalic and atraumatic.      Right Ear: External ear normal.      Left Ear: External ear normal.      Nose: Nose normal.      Mouth/Throat:      Lips: Pink.      Mouth: Mucous membranes are moist.      Pharynx: Oropharynx is clear. Uvula midline.   Eyes:      Extraocular Movements: Extraocular movements intact.      Conjunctiva/sclera: Conjunctivae normal.      Pupils: Pupils are equal, round, and reactive to light.   Neck:      Musculoskeletal: Normal range of motion and neck supple.      Trachea: Trachea and phonation normal.   Cardiovascular:      Rate and Rhythm: Normal rate and regular rhythm.      Pulses: Normal pulses.      Heart sounds: Normal heart sounds. No murmur.   Pulmonary:      Effort: Pulmonary effort is normal.      Breath sounds: Normal breath sounds. No " decreased breath sounds, wheezing, rhonchi or rales.   Abdominal:      General: Bowel sounds are normal.      Palpations: Abdomen is soft.      Tenderness: There is no abdominal tenderness.   Musculoskeletal: Normal range of motion.         General: No tenderness.      Right lower leg: No edema.      Left lower leg: No edema.   Lymphadenopathy:      Cervical: No cervical adenopathy.   Skin:     General: Skin is warm.      Capillary Refill: Capillary refill takes less than 2 seconds.      Coloration: Skin is not pale.   Neurological:      Mental Status: She is alert and oriented to person, place, and time.   Psychiatric:         Mood and Affect: Mood normal.         Behavior: Behavior normal. Behavior is cooperative.         ED Course     Procedures       Critical Care time:  none    Results for orders placed or performed during the hospital encounter of 10/13/20 (from the past 24 hour(s))   CBC with platelets differential   Result Value Ref Range    WBC 6.4 4.0 - 11.0 10e9/L    RBC Count 4.09 3.8 - 5.2 10e12/L    Hemoglobin 13.2 11.7 - 15.7 g/dL    Hematocrit 39.8 35.0 - 47.0 %    MCV 97 78 - 100 fl    MCH 32.3 26.5 - 33.0 pg    MCHC 33.2 31.5 - 36.5 g/dL    RDW 12.7 10.0 - 15.0 %    Platelet Count 237 150 - 450 10e9/L    Diff Method Automated Method     % Neutrophils 75.3 %    % Lymphocytes 11.0 %    % Monocytes 11.8 %    % Eosinophils 1.4 %    % Basophils 0.3 %    % Immature Granulocytes 0.2 %    Absolute Neutrophil 4.8 1.6 - 8.3 10e9/L    Absolute Lymphocytes 0.7 (L) 0.8 - 5.3 10e9/L    Absolute Monocytes 0.8 0.0 - 1.3 10e9/L    Absolute Eosinophils 0.1 0.0 - 0.7 10e9/L    Absolute Basophils 0.0 0.0 - 0.2 10e9/L    Abs Immature Granulocytes 0.0 0 - 0.4 10e9/L   Basic metabolic panel   Result Value Ref Range    Sodium 137 134 - 144 mmol/L    Potassium 3.2 (L) 3.5 - 5.1 mmol/L    Chloride 102 98 - 107 mmol/L    Carbon Dioxide 29 21 - 31 mmol/L    Anion Gap 6 3 - 14 mmol/L    Glucose 117 (H) 70 - 105 mg/dL    Urea  Nitrogen 14 7 - 25 mg/dL    Creatinine 0.77 0.60 - 1.20 mg/dL    GFR Estimate 72 >60 mL/min/[1.73_m2]    GFR Estimate If Black 87 >60 mL/min/[1.73_m2]    Calcium 9.4 8.6 - 10.3 mg/dL       Medications   LORazepam (ATIVAN) injection 0.5 mg (0.5 mg Intravenous Given 10/14/20 0017)   potassium chloride ER (KLOR-CON M) CR tablet 40 mEq (40 mEq Oral Given 10/14/20 0122)       Assessments & Plan (with Medical Decision Making)     Medications   LORazepam (ATIVAN) injection 0.5 mg (0.5 mg Intravenous Given 10/14/20 0017)   potassium chloride ER (KLOR-CON M) CR tablet 40 mEq (40 mEq Oral Given 10/14/20 0122)     Labs reviewed as above.    Patient was seen only a few hours ago with similar complaints had a very thorough evaluation including labs, EKG, chest x-ray all of which were unremarkable.  There is no indication that that extensive of an evaluation needs to be repeated at this time.  Patient is rather anxious.  She has been checking her blood pressure every hour which is increasing her anxiety and likely increasing her blood pressure.  Patient's blood pressure has improved since being here in the emergency room.  The patient is not given any additional blood pressure medication here in the emergency room as her blood pressure is not significantly elevated.  Am concerned that the patient would actually become hypotensive if she is given additional blood pressure medication as her blood pressure is improved to the 140s over 70s here.  Patient is discharged to home in good condition.  Reassurance is provided.  Take Zestoretic tomorrow morning and follow-up with primary care physician.  The patient is specifically instructed to check her blood pressure only once a day.  I had a discussion with the patient and the family regarding the symptoms, exam, laboratory results, diagnosis, and plan.    I answered all questions to the best of my ability.  The patient voiced understanding of the plan, was agreeable, and had no further  questions or concerns. Advised to return for any worsening symptoms.    I have reviewed the nursing notes.    New Prescriptions    No medications on file       Final diagnoses:   Benign essential hypertension   Anxiety   Hypokalemia       10/13/2020   [facility] AND \[facility] [place] [facility]\""     Herrera Vásquez MD  10/14/20 0131

## 2020-10-14 NOTE — ED TRIAGE NOTES
Has been to ER twice in the past 24hrs and treated for hypertension. Woke up about 2300 tonight and noticed that her vision was blurry and states that she just didn't feel quite right. Patient then checked her BP and felt it was too high and decided to come to ER. Stroke signs negative.

## 2020-10-15 ENCOUNTER — NURSE TRIAGE (OUTPATIENT)
Dept: FAMILY MEDICINE | Facility: OTHER | Age: 80
End: 2020-10-15

## 2020-10-15 ENCOUNTER — APPOINTMENT (OUTPATIENT)
Dept: MRI IMAGING | Facility: OTHER | Age: 80
End: 2020-10-15
Attending: PHYSICIAN ASSISTANT
Payer: MEDICARE

## 2020-10-15 ENCOUNTER — HOSPITAL ENCOUNTER (EMERGENCY)
Facility: OTHER | Age: 80
Discharge: HOME | End: 2020-10-15
Attending: PHYSICIAN ASSISTANT | Admitting: PHYSICIAN ASSISTANT
Payer: MEDICARE

## 2020-10-15 VITALS
WEIGHT: 160 LBS | DIASTOLIC BLOOD PRESSURE: 82 MMHG | BODY MASS INDEX: 27.46 KG/M2 | OXYGEN SATURATION: 98 % | TEMPERATURE: 98.1 F | HEART RATE: 64 BPM | SYSTOLIC BLOOD PRESSURE: 149 MMHG | RESPIRATION RATE: 17 BRPM

## 2020-10-15 DIAGNOSIS — I16.0 HYPERTENSIVE URGENCY: ICD-10-CM

## 2020-10-15 DIAGNOSIS — H53.8 BLURRED VISION: ICD-10-CM

## 2020-10-15 PROCEDURE — 250N000013 HC RX MED GY IP 250 OP 250 PS 637: Performed by: PHYSICIAN ASSISTANT

## 2020-10-15 PROCEDURE — 99283 EMERGENCY DEPT VISIT LOW MDM: CPT | Performed by: PHYSICIAN ASSISTANT

## 2020-10-15 PROCEDURE — 70551 MRI BRAIN STEM W/O DYE: CPT

## 2020-10-15 PROCEDURE — 99284 EMERGENCY DEPT VISIT MOD MDM: CPT | Mod: 25 | Performed by: PHYSICIAN ASSISTANT

## 2020-10-15 RX ORDER — CLONIDINE HYDROCHLORIDE 0.1 MG/1
0.1 TABLET ORAL ONCE
Status: COMPLETED | OUTPATIENT
Start: 2020-10-15 | End: 2020-10-15

## 2020-10-15 RX ORDER — CLONIDINE HYDROCHLORIDE 0.1 MG/1
0.1 TABLET ORAL 2 TIMES DAILY
Qty: 60 TABLET | Refills: 0 | Status: SHIPPED | OUTPATIENT
Start: 2020-10-15

## 2020-10-15 RX ORDER — OXYCODONE AND ACETAMINOPHEN 5; 325 MG/1; MG/1
1 TABLET ORAL ONCE
Status: DISCONTINUED | OUTPATIENT
Start: 2020-10-15 | End: 2020-10-15 | Stop reason: CLARIF

## 2020-10-15 RX ORDER — IBUPROFEN 400 MG/1
800 TABLET, FILM COATED ORAL ONCE
Status: DISCONTINUED | OUTPATIENT
Start: 2020-10-15 | End: 2020-10-15 | Stop reason: CLARIF

## 2020-10-15 RX ORDER — CLONIDINE HYDROCHLORIDE 0.1 MG/1
0.2 TABLET ORAL ONCE
Status: COMPLETED | OUTPATIENT
Start: 2020-10-15 | End: 2020-10-15

## 2020-10-15 RX ADMIN — CLONIDINE HYDROCHLORIDE 0.2 MG: 0.1 TABLET ORAL at 11:17

## 2020-10-15 RX ADMIN — CLONIDINE HYDROCHLORIDE 0.1 MG: 0.1 TABLET ORAL at 12:22

## 2020-10-15 ASSESSMENT — ENCOUNTER SYMPTOMS
SORE THROAT: 0
DIZZINESS: 0
COLOR CHANGE: 0
SHORTNESS OF BREATH: 0
TREMORS: 0
DIARRHEA: 0
SPEECH DIFFICULTY: 0
WEAKNESS: 0
DYSURIA: 0
TROUBLE SWALLOWING: 0
COUGH: 0
CHEST TIGHTNESS: 0
FREQUENCY: 0
FACIAL ASYMMETRY: 0
STRIDOR: 0
SEIZURES: 0
NECK PAIN: 0
APPETITE CHANGE: 0
RHINORRHEA: 0
BACK PAIN: 0
LIGHT-HEADEDNESS: 0
NAUSEA: 0
VOMITING: 0
FACIAL SWELLING: 0
NECK STIFFNESS: 0
FATIGUE: 0
EYE PAIN: 0
WHEEZING: 0
CONSTIPATION: 0
HEADACHES: 0
ACTIVITY CHANGE: 0
ABDOMINAL PAIN: 0
FEVER: 0

## 2020-10-15 NOTE — ED TRIAGE NOTES
Patient presents to the ED with complaints of hypertension.  Patient was seen in the ED x2 last week for hypertension and states at that time her hydrochlorothiazide was increased from 12.5 to 25mg.  Patient states she has been doing daily blood pressure checks and this morning her BP was 187/111.  Patient denies symptoms associated with hypertension and states she has an apt with her primary tomorrow, however, was advised by the triage nurse to come in as she will be driving home to the [facility] for her apt. Patient states she took her medication this morning.

## 2020-10-15 NOTE — ED PROVIDER NOTES
History     Chief Complaint   Patient presents with     Hypertension     HPI  Josi Singh is a 80 year old female who was seen originally on 10/12/2020 in the emergency room due to a nosebleed.  At that time it was noted that she had severe hypertension.  She was started on Zestoretic and she was seen again in the emergency room on 10/13/2020 due to hypertensive urgency she had not taken her first medication at that point.  She was given Catapres orally in the ER and this controlled her blood pressure nicely.  She had extensive evaluation with only minimal elevation in her BNP and a follow-up was arranged on 10/21/2020 with Suzette Stephenson for further management and to establish care locally.  However apparently the patient returned to the emergency room again at that time her blood pressure was 140s/80s and patient was told to just continue monitoring her blood pressure.  Today the patient called the nursing hotline stating her blood pressure was up again.  She thinks she has been eating some increased salty foods.  She has taken her Zestoretic for the day.  Denies any chest pain or shortness of breath.  No lightheadedness or dizziness.  No nausea or vomiting.  She appears in no distress and is here for further evaluation.  Her blood pressure upon arrival is 152/111.  While rechecking with the patient she reports she has been having some episodes of visual blurriness but this has resolved at this time.  Allergies:  No Known Allergies    Problem List:    There are no active problems to display for this patient.       Past Medical History:    Past Medical History:   Diagnosis Date     Allergic rhinitis      Closed dislocation, multiple and ill-defined sites      Compression fracture of spine (H)      Coronary artery disease      Degeneration of intervertebral disc, site unspecified      Disorder of bone and cartilage, unspecified      Essential hypertension, benign      Glaucoma of both eyes      Macular  degeneration      Other abnormal clinical finding      Other and unspecified hyperlipidemia      Sprain and strain of unspecified site of knee and leg      Vitamin D deficiency        Past Surgical History:    Past Surgical History:   Procedure Laterality Date     GYN SURGERY      tubal     ORTHOPEDIC SURGERY Right     LYSSA     PHACOEMULSIFICATION CLEAR CORNEA W/ STANDARD IOL IMPLANT, ENDOSCOPIC CYCLOPHOTOCOAGULATION, COMBINED Right 12/14/2017    Procedure: COMBINED PHACOEMULSIFICATION CLEAR CORNEA WITH STANDARD INTRAOCULAR LENS IMPLANT, ENDOSCOPIC CYCLOPHOTOCOAGULATION;  RIGHT EYE COMBINED PHACOEMULSIFICATION CLEAR CORNEA WITH STANDARD INTRAOCULAR LENS IMPLANT, ENDOSCOPIC CYCLOPHOTOCOAGULATION ;  Surgeon: Yosvany Zamora MD;  Location:  [facility]     PHACOEMULSIFICATION CLEAR CORNEA W/ STANDARD IOL IMPLANT, ENDOSCOPIC CYCLOPHOTOCOAGULATION, COMBINED Left 2/1/2018    Procedure: COMBINED PHACOEMULSIFICATION CLEAR CORNEA WITH STANDARD INTRAOCULAR LENS IMPLANT, ENDOSCOPIC CYCLOPHOTOCOAGULATION;  LEFT EYE COMBINED PHACOEMULSIFICATION CLEAR CORNEA WITH STANDARD INTRAOCULAR LENS IMPLANT, ENDOSCOPIC CYCLOPHOTOCOAGULATION;  Surgeon: Yosvany Zamora MD;  Location:  [facility]     ZZC NONSPECIFIC PROCEDURE  1988    rt shoulder dislocation and fx     ZZC NONSPECIFIC PROCEDURE  1971    TL       Family History:    History reviewed. No pertinent family history.    Social History:  Marital Status:   [5]  Social History     Tobacco Use     Smoking status: Never Smoker     Smokeless tobacco: Never Used   Substance Use Topics     Alcohol use: Yes     Frequency: Monthly or less     Comment: occ     Drug use: No        Medications:         bimatoprost (LUMIGAN) 0.01 % SOLN       Calcium 250 MG CAPS       dorzolamide-timolol (COSOPT) 2-0.5 % ophthalmic solution       fluticasone (FLONASE) 50 MCG/ACT spray       GABAPENTIN PO       lisinopril-hydrochlorothiazide (ZESTORETIC) 20-25 MG tablet       Multiple Vitamin  (MULTI-VITAMINS) TABS       potassium chloride ER (K-TAB) 20 MEQ CR tablet          Review of Systems   Constitutional: Negative for activity change, appetite change, fatigue and fever.   HENT: Negative for drooling, facial swelling, rhinorrhea, sore throat and trouble swallowing.    Eyes: Negative for pain and visual disturbance.   Respiratory: Negative for cough, chest tightness, shortness of breath, wheezing and stridor.    Cardiovascular: Negative for chest pain and leg swelling.   Gastrointestinal: Negative for abdominal pain, constipation, diarrhea, nausea and vomiting.   Genitourinary: Negative for dysuria, frequency and urgency.   Musculoskeletal: Negative for back pain, neck pain and neck stiffness.   Skin: Negative for color change.   Neurological: Negative for dizziness, tremors, seizures, facial asymmetry, speech difficulty, weakness, light-headedness and headaches.       Physical Exam   BP: (!) 152/111  Pulse: 66  Temp: 98.1  F (36.7  C)  Resp: 17  Weight: 72.6 kg (160 lb)  SpO2: 99 %    Vitals:    10/15/20 1250 10/15/20 1300 10/15/20 1320 10/15/20 1330   BP: 118/60 (!) 142/95 (!) 157/79 (!) 149/82   Pulse:  65  64   Resp:       Temp:       TempSrc:       SpO2:       Weight:         Vitals:    10/15/20 1250 10/15/20 1300 10/15/20 1320 10/15/20 1330   BP: 118/60 (!) 142/95 (!) 157/79 (!) 149/82   Pulse:  65  64   Resp:       Temp:       TempSrc:       SpO2:       Weight:           Physical Exam  Constitutional:       General: She is not in acute distress.     Appearance: She is not ill-appearing, toxic-appearing or diaphoretic.   HENT:      Head: No raccoon eyes or Avelar's sign.      Jaw: No trismus.      Right Ear: No drainage or tenderness.      Left Ear: No drainage or tenderness.      Nose: Nose normal.   Eyes:      General: No scleral icterus.     Extraocular Movements: Extraocular movements intact.      Right eye: Normal extraocular motion and no nystagmus.      Left eye: Normal extraocular motion  and no nystagmus.      Pupils: Pupils are equal, round, and reactive to light.      Right eye: Pupil is reactive and not sluggish.      Left eye: Pupil is reactive and not sluggish.      Funduscopic exam:     Right eye: No AV nicking, arteriolar narrowing or papilledema. Red reflex present.         Left eye: No AV nicking, arteriolar narrowing or papilledema. Red reflex present.  Neck:      Musculoskeletal: Normal range of motion. Normal range of motion. No neck rigidity, pain with movement, spinous process tenderness or muscular tenderness.      Vascular: No JVD.      Trachea: No tracheal deviation.   Cardiovascular:      Rate and Rhythm: Normal rate and regular rhythm.   Pulmonary:      Effort: Pulmonary effort is normal. No respiratory distress.      Breath sounds: Normal breath sounds. No stridor. No wheezing.      Comments: Lung sounds are clear.  SaO2 is 99% on room air.  She does not appear to be in any respiratory distress.  No tachypnea.  Abdominal:      General: There is no distension.      Palpations: There is no mass.      Tenderness: There is no abdominal tenderness. There is no right CVA tenderness, left CVA tenderness, guarding or rebound.   Musculoskeletal: Normal range of motion.         General: No tenderness or deformity.   Lymphadenopathy:      Cervical: No cervical adenopathy.      Right cervical: No superficial cervical adenopathy.     Left cervical: No superficial cervical adenopathy.   Skin:     General: Skin is warm and dry.      Capillary Refill: Capillary refill takes less than 2 seconds.   Neurological:      Mental Status: She is alert and oriented to person, place, and time.      GCS: GCS eye subscore is 4. GCS verbal subscore is 5. GCS motor subscore is 6.      Motor: No tremor or seizure activity.      Coordination: Coordination normal.      Gait: Gait normal.         ED Course          Results for orders placed or performed during the hospital encounter of 10/15/20 (from the past 24  hour(s))   MR Brain w/o Contrast    Narrative    EXAM:  MR BRAIN W/O CONTRAST    HISTORY:  reporting blurred vision. .    TECHNIQUE:  Sagittal T1, axial T1, T2, FLAIR, diffusion, and gradient  noncontrast imaging of the whole brain was performed.    COMPARISON:  None.     FINDINGS:    Prominence of the ventricles and sulci is compatible with mild,  generalized volume loss. No abnormal extra-axial collection,  hydrocephalus, mass effect or midline shift is seen. The basal  cisterns are preserved.    Scattered foci of T2/FLAIR hyperintense signal within the  supratentorial white matter are most compatible with mild chronic  microvascular ischemic change. No abnormal intracranial enhancement or  concerning T2* gradient susceptibility is identified. No restricted  diffusion is present.  The major intracranial vascular flow voids are  preserved.    The T1 marrow signal is unremarkable. Ocular lens replacements are  noted.      Impression    IMPRESSION: No acute infarct, edema or mass effect is seen. Mild  chronic microvascular ischemic changes.    ELENITA HANSON MD       Medications   cloNIDine (CATAPRES) tablet 0.2 mg (0.2 mg Oral Given 10/15/20 1117)   cloNIDine (CATAPRES) tablet 0.1 mg (0.1 mg Oral Given 10/15/20 1222)       Assessments & Plan (with Medical Decision Making)     I have reviewed the nursing notes.    I have reviewed the findings, diagnosis, plan and need for follow up with the patient.      New Prescriptions    CLONIDINE (CATAPRES) 0.1 MG TABLET    Take 1 tablet (0.1 mg) by mouth 2 times daily       Final diagnoses:   Hypertensive urgency   Blurred vision - temporary     Afebrile.  Vital signs stable but noted hypertension.  She has been started recently on Zestoretic and she has been taking this up.  Now reporting some episodes of blurred vision as well.  The patient was given Catapres orally 0.2 mg.  MRI was arranged and this shows no acute infarct, edema or mass-effect seen.  The patient has  some mild chronic microvascular ischemic changes.  I discussed with the patient that most likely this would not correspond with her blurred vision.  She is scheduled to see her eye doctor tomorrow.  She was given additional Catapres and gradually with time her blood pressure decreased to acceptable levels.  She will continue with her Zestoretic, we are adding Catapres at this time.  She already has a follow-up scheduled with Suzette Stephenson on 10/21/2020 and she was encouraged to keep this.  Follow-up sooner if there is any other concerns or questions.  10/15/2020   [facility]     Chau Escalera PA-C  10/15/20 1260

## 2020-10-15 NOTE — ED AVS SNAPSHOT
[facility]  [address]  [place] [address]  Phone: [phone]  Fax: [phone]                                    Josi Singh   MRN: [id number]    Department: [facility]   Date of Visit: 10/15/2020           After Visit Summary Signature Page    I have received my discharge instructions, and my questions have been answered. I have discussed any challenges I see with this plan with the nurse or doctor.    ..........................................................................................................................................  Patient/Patient Representative Signature      ..........................................................................................................................................  Patient Representative Print Name and Relationship to Patient    ..................................................               ................................................  Date                                   Time    ..........................................................................................................................................  Reviewed by Signature/Title    ...................................................              ..............................................  Date                                               Time          22EPIC Rev 08/18

## 2022-07-27 ENCOUNTER — OFFICE VISIT (OUTPATIENT)
Dept: FAMILY MEDICINE | Facility: OTHER | Age: 82
End: 2022-07-27
Attending: PHYSICIAN ASSISTANT
Payer: COMMERCIAL

## 2022-07-27 VITALS
SYSTOLIC BLOOD PRESSURE: 168 MMHG | TEMPERATURE: 97 F | OXYGEN SATURATION: 97 % | DIASTOLIC BLOOD PRESSURE: 90 MMHG | BODY MASS INDEX: 28.85 KG/M2 | RESPIRATION RATE: 20 BRPM | HEART RATE: 67 BPM | WEIGHT: 168.1 LBS
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
## 2022-07-27 DIAGNOSIS — S80.862A INSECT BITE OF LEFT LOWER LEG WITH LOCAL REACTION, INITIAL ENCOUNTER: ICD-10-CM

## 2022-07-27 PROCEDURE — 87070 CULTURE OTHR SPECIMN AEROBIC: CPT | Mod: ZL | Performed by: NURSE PRACTITIONER

## 2022-07-27 PROCEDURE — G0463 HOSPITAL OUTPT CLINIC VISIT: HCPCS

## 2022-07-27 PROCEDURE — 99213 OFFICE O/P EST LOW 20 MIN: CPT | Performed by: NURSE PRACTITIONER

## 2022-07-27 RX ORDER — CEPHALEXIN 500 MG/1
500 CAPSULE ORAL 2 TIMES DAILY
Qty: 14 CAPSULE | Refills: 0 | Status: SHIPPED | OUTPATIENT
Start: 2022-07-27 | End: 2022-08-03

## 2022-07-27 RX ORDER — AMLODIPINE BESYLATE 5 MG/1
TABLET ORAL
COMMUNITY
Start: 2022-07-19

## 2022-07-27 RX ORDER — LATANOPROST 50 UG/ML
SOLUTION/ DROPS OPHTHALMIC
COMMUNITY
Start: 2020-11-17

## 2022-07-27 RX ORDER — POTASSIUM CHLORIDE 1500 MG/1
20 TABLET, EXTENDED RELEASE ORAL
COMMUNITY
Start: 2022-06-22

## 2022-07-27 ASSESSMENT — PAIN SCALES - GENERAL: PAINLEVEL: NO PAIN (0)

## 2022-07-27 NOTE — NURSING NOTE
"Chief Complaint   Patient presents with     Insect Bites     Multiple bug bites   on legs and abdomin       Medication reconciliation completed.    FOOD SECURITY SCREENING QUESTIONS:    The next two questions are to help us understand your food security.  If you are feeling you need any assistance in this area, we have resources available to support you today.    Hunger Vital Signs:  Within the past 12 months we worried whether our food would run out before we got money to buy more. Never  Within the past 12 months the food we bought just didn't last and we didn't have money to get more. Never    Initial Pulse 67   Temp 97  F (36.1  C) (Temporal)   Resp 20   Wt 76.2 kg (168 lb 1.6 oz)   SpO2 97%   Breastfeeding No   BMI 28.85 kg/m   Estimated body mass index is 28.85 kg/m  as calculated from the following:    Height as of 10/13/20: 1.626 m (5' 4\").    Weight as of this encounter: 76.2 kg (168 lb 1.6 oz).       Heidi Ortega LPN .......  7/27/2022  3:25 PM  "

## 2022-07-29 LAB — BACTERIA WND CULT: NO GROWTH

## 2022-08-11 ENCOUNTER — NURSE TRIAGE (OUTPATIENT)
Dept: NURSING | Facility: CLINIC | Age: 82
End: 2022-08-11

## 2022-08-12 ENCOUNTER — VIRTUAL VISIT (OUTPATIENT)
Dept: FAMILY MEDICINE | Facility: CLINIC | Age: 82
End: 2022-08-12
Payer: COMMERCIAL

## 2022-08-12 DIAGNOSIS — U07.1 INFECTION DUE TO 2019 NOVEL CORONAVIRUS: Primary | ICD-10-CM

## 2022-08-12 PROCEDURE — 99441 PR PHYSICIAN TELEPHONE EVALUATION 5-10 MIN: CPT | Mod: CS | Performed by: NURSE PRACTITIONER

## 2022-08-12 NOTE — TELEPHONE ENCOUNTER
Nurse Triage SBAR    Situation: Covid    Background: Patient calling. Symptoms started 3 days ago. Tested positive for covid today.     Assessment: Cough  - slightly productive, mild fever - 99.1. Taking tylenol. Denied shortness of breath. Denied chest pain. NO [facility] PCP. Patient wants to be started on paxlovid.     Protocol Recommended Disposition: Page/Call Provider    Recommendation: According to the protocol, Patient should Schedual a virtal appointment with a provider for covid elyse. Advised Patient to make an appointment. Care advice given. Patient verbalizes understanding and agrees with plan of care. Reviewed concerning symptoms and when to call back. Connected with scheduling.       Bethanie Shahid RN Nursing Advisor 8/11/2022 8:38 PM     Reason for Disposition    HIGH RISK for severe COVID complications (e.g., weak immune system, age > 64 years, obesity with BMI > 25, pregnant, chronic lung disease or other chronic medical condition)  (Exception: Already seen by PCP and no new or worsening symptoms.)    Additional Information    Negative: SEVERE difficulty breathing (e.g., struggling for each breath, speaks in single words)    Negative: MODERATE difficulty breathing (e.g., speaks in phrases, SOB even at rest, pulse 100-120)    Negative: MILD difficulty breathing (e.g., minimal/no SOB at rest, SOB with walking, pulse <100)    Negative: Difficult to awaken or acting confused (e.g., disoriented, slurred speech)    Negative: Bluish (or gray) lips or face now    Negative: Shock suspected (e.g., cold/pale/clammy skin, too weak to stand, low BP, rapid pulse)    Negative: Sounds like a life-threatening emergency to the triager    Negative: [1] Diagnosed or suspected COVID-19 AND [2] symptoms lasting 3 or more weeks    Negative: [1] COVID-19 exposure AND [2] no symptoms    Negative: COVID-19 vaccine reaction suspected (e.g., fever, headache, muscle aches) occurring 1 to 3 days after getting vaccine    Negative:  COVID-19 vaccine, questions about    Negative: [1] Lives with someone known to have influenza (flu test positive) AND [2] flu-like symptoms (e.g., cough, runny nose, sore throat, SOB; with or without fever)    Negative: [1] Adult with possible COVID-19 symptoms AND [2] triager concerned about severity of symptoms or other causes    Negative: COVID-19 and breastfeeding, questions about    Negative: SEVERE or constant chest pain or pressure  (Exception: Mild central chest pain, present only when coughing.)    Negative: Chest pain or pressure    Negative: Patient sounds very sick or weak to the triager    Negative: [1] Headache AND [2] stiff neck (can't touch chin to chest)    Negative: Oxygen level (e.g., pulse oximetry) 90 percent or lower    Negative: Fever > 103 F (39.4 C)    Negative: [1] Fever > 101 F (38.3 C) AND [2] age > 60 years    Negative: [1] Fever > 100.0 F (37.8 C) AND [2] bedridden (e.g., nursing home patient, CVA, chronic illness, recovering from surgery)    Protocols used: CORONAVIRUS (COVID-19) DIAGNOSED OR [id number] 1.18.2022

## 2022-08-12 NOTE — PROGRESS NOTES
"Josi is a 81 year old who is being evaluated via a billable telephone visit.      What phone number would you like to be contacted at? [phone]  How would you like to obtain your AVS? Mail a copy    Assessment & Plan     Infection due to 2019 novel coronavirus  Symptoms mild, vaccinated, boosted. Discussed risk/benefits of antiviral treatment, would like to proceed with Paxlovid. Symptomatic care, quarantine guidance discussed.  - nirmatrelvir and ritonavir (PAXLOVID) therapy pack; Take 3 tablets by mouth 2 times daily for 5 days (Take 2 Nirmatrelvir tablets and 1 Ritonavir tablet twice daily for 5 days)    COVID-19 positive patient.  Encounter for consideration of medication intervention. Patient does qualify for a prescription. Full discussion with patient including medication options, risks and benefits. Potential drug interactions reviewed with patient.     Treatment Planned Paxlovid, Rx sent to [place] pharmacy  Preferred [facility]  Temporary change to home medications:  None     Estimated body mass index is 28.85 kg/m  as calculated from the following:    Height as of 10/13/20: 1.626 m (5' 4\").    Weight as of 7/27/22: 76.2 kg (168 lb 1.6 oz).  GFR Estimate   Date Value Ref Range Status   10/14/2020 72 >60 mL/min/[1.73_m2] Final     No results found for: [id number]    Return in about 1 week (around 8/19/2022) for worsening or continued symptoms.    ILDA Pizarro CNP  [facility]    Subjective   Josi is a 81 year old, presenting for the following health issues:  Covid Concern      HPI       COVID-19 Symptom Review  How many days ago did these symptoms start? 3 days    Are any of the following symptoms significant for you?    New or worsening difficulty breathing? No    Worsening cough? Yes, it's a dry cough.     Fever or chills? Yes, I felt feverish or had chills.    Headache: No    Sore throat: YES    Chest pain: No    Diarrhea: No    Body aches? No    What treatments has " patient tried? Acetaminophen   Does patient live in a nursing home, group home, or shelter? No  Does patient have a way to get food/medications during quarantined? Yes, I have a friend or family member who can help me.                  Review of Systems   Constitutional, HEENT, cardiovascular, pulmonary, gi and gu systems are negative, except as otherwise noted.      Objective           Vitals:  No vitals were obtained today due to virtual visit.    Physical Exam   healthy, alert and no distress  PSYCH: Alert and oriented times 3; coherent speech, normal   rate and volume, able to articulate logical thoughts, able   to abstract reason, no tangential thoughts, no hallucinations   or delusions  Her affect is normal  RESP: No cough, no audible wheezing, able to talk in full sentences  Remainder of exam unable to be completed due to telephone visits                Phone call duration: 9 minutes    .  ..

## 2022-08-12 NOTE — PATIENT INSTRUCTIONS
"  Instructions for Patients  Your COVID-19 test was positive. This means you have the virus. Please follow the \"How can I take care of myself\" and \"How do I self-isolate?\" guidelines in these instructions.    What treatments are available?  Over-the-counter medicines may help with your symptoms such as runny or stuffy nose, cough, chills, and fever. Talk to your care team about your options.     Some people are at high risk for severe illness (for example if you have a weak immune system, you're 65 or older, or you have certain medical problems). If your risk it high and your symptoms started in the last 5 to 7 days, we strongly recommend for you to get COVID treatment as soon as possible before you get really sick. Paxlovid, Molnupiravir and the monoclonal antibody treatments are proven safe and effective, make you feel better faster, and prevent hospitalization and death.       You can schedule an appointment to discuss COVID treatment by requesting an appointment on [facility] by selecting \"schedule COVID-19 Treatment\" or by calling [facility] ([phone]) and pressing 7.    What are the symptoms of COVID-19?  Symptoms can include fever, cough, shortness of breath, chills, headache, muscle pain sore throat, fatigue, runny or stuffy nose, and loss of taste and smell. Other less common symptoms include nausea, vomiting, or diarrhea (watery stools).    Know when to call 911. Emergency warning signs include:    Trouble breathing or shortness of breath    Pain or pressure in the chest that doesn't go away    Feeling confused like you haven't felt before, or not being able to wake up    Bluish-colored lips or face    How can I take care of myself?  1. Get lots of rest. Drink extra fluids (unless a doctor has told you not to).  2. Take Tylenol (acetaminophen) for fever or pain. If you have liver or kidney problems, ask your family doctor if it's okay to take Tylenol   Adults:   650 mg (two 325 mg pills or tablets) " every 4 to 6 hours, or...   1,000 mg (two 500 mg pills or tablets) every 8 hours as needed.  Note: Don't take more than 3,000 mg in one day. Acetaminophen is found in many medicines (both prescribed and over the counter). Read all labels to be sure you don't take too much.  For children, check the Tylenol bottle for the right dose. The dose is based on the child's age or weight.  3. Take over the counter medicines for your symptoms as needed. Talk to your pharmacist.  4. If you have other health problems (like cancer, heart failure, an organ transplant, or severe kidney disease): Call your specialty clinic if you don't feel better in the next 2 days.    These guidelines are for isolating and quarantining before returning to work, school or .     For employers, schools and day cares: This is an official notice for this person s medical guidelines for returning in-person.     For health care sites: The CDC gives different isolation and quarantine guidelines for healthcare sites, please check with these sites before arriving.     How do I self-isolate?  You isolate when you have symptoms of COVID or a test shows you have COVID, even if you don t have symptoms.     If you DO have symptoms:  o Stay home and away from others  - For at least 5 days after your symptoms started, AND   - You are fever free for 24 hours (with no medicine that reduces fever), AND  - Your other symptoms are better.  o Wear a mask for 10 full days any time you are around others.    If you DON T have symptoms:  o Stay at home and away from others for at least 5 days after your positive test.  o Wear a mask for 10 full days any time you are around others.    How and when do I quarantine?  You quarantine when you may have been exposed to the virus and DON T have symptoms.     Stay home and away from others.     You must quarantine for 5 days after your last contact with a person who has COVID.  o Note: If you are fully vaccinated, you don t  need to quarantine. You should still follow the steps below.     Wear a mask for 10 full days any time you re around others.    Get tested at least 5 days after you were exposed, even if you don t have symptoms.     If you start to have symptoms, isolate right away and get tested.    Where can I get more information?    [facility] COVID-19 Resource Hub: [email or web address]/[email or web address]/     CDC Quarantine & Isolation: https://www.cdc.gov/coronavirus/2019-ncov/your-health/quarantine-isolation.html     CDC - What to Do If You're Sick: https://www.cdc.gov/coronavirus/2019-ncov/if-you-are-sick/index.html    [facility] clinical trials (COVID-19 research studies): [email or web address]    [place] Department of Health COVID-19 Public Hotline: 1-747.640.9825

## 2023-04-27 NOTE — PROGRESS NOTES
ASSESSMENT/PLAN:    I have reviewed the nursing notes.  I have reviewed the findings, diagnosis, plan and need for follow up with the patient.    1. Bug bite with infection, initial encounter  - Wound Aerobic Bacterial Culture Routine    2. Insect bite of left lower leg with local reaction, initial encounter  - cephALEXin (KEFLEX) 500 MG capsule; Take 1 capsule (500 mg) by mouth 2 times daily for 7 days  Dispense: 14 capsule; Refill: 0  Afebrile; no evidence of systemic infection. A few of the sites located on left lower extremity do appear erythematous with warmth and tenderness surrounding the insect bites. Oral abx appropriate for this. Patient desired to take oral abx. She also had strong desire that these be cultured. The culture showed no bacterial growth which is good news. However. Cellulitis symptoms being present recommend continuing antibiotic.     Discussed warning signs/symptoms indicative of need to f/u    Follow up if symptoms persist or worsen or concerns    I explained my diagnostic considerations and recommendations to the patient, who voiced understanding and agreement with the treatment plan. All questions were answered. We discussed potential side effects of any prescribed or recommended therapies, as well as expectations for response to treatments.    Adelina Soto NP  7/27/2022  3:53 PM    HPI:  Josi Singh is a 81 year old female who presents to [facility] today for concerns of multiple bug bites on legs and abdomen. She had been working outside the day she noticed them. Some of them have gotten big and blistered. Does not feel sick or unwell. Not sure what bit her. Some of the sites are draining a yellow, thick drainage. She is very worried about this. She has had infections from insect bites before and reactions. She denies fever/chills.     Past Medical History:   Diagnosis Date     Allergic rhinitis      Closed dislocation, multiple and ill-defined sites      Compression fracture  of spine (H)      Coronary artery disease      Degeneration of intervertebral disc, site unspecified      Disorder of bone and cartilage, unspecified      Essential hypertension, benign      Glaucoma of both eyes      Macular degeneration      Other abnormal clinical finding      Other and unspecified hyperlipidemia      Sprain and strain of unspecified site of knee and leg      Vitamin D deficiency      Past Surgical History:   Procedure Laterality Date     GYN SURGERY      tubal     ORTHOPEDIC SURGERY Right     LYSSA     PHACOEMULSIFICATION CLEAR CORNEA W/ STANDARD IOL IMPLANT, ENDOSCOPIC CYCLOPHOTOCOAGULATION, COMBINED Right 12/14/2017    Procedure: COMBINED PHACOEMULSIFICATION CLEAR CORNEA WITH STANDARD INTRAOCULAR LENS IMPLANT, ENDOSCOPIC CYCLOPHOTOCOAGULATION;  RIGHT EYE COMBINED PHACOEMULSIFICATION CLEAR CORNEA WITH STANDARD INTRAOCULAR LENS IMPLANT, ENDOSCOPIC CYCLOPHOTOCOAGULATION ;  Surgeon: Yosvany Zamora MD;  Location:  [facility]     PHACOEMULSIFICATION CLEAR CORNEA W/ STANDARD IOL IMPLANT, ENDOSCOPIC CYCLOPHOTOCOAGULATION, COMBINED Left 2/1/2018    Procedure: COMBINED PHACOEMULSIFICATION CLEAR CORNEA WITH STANDARD INTRAOCULAR LENS IMPLANT, ENDOSCOPIC CYCLOPHOTOCOAGULATION;  LEFT EYE COMBINED PHACOEMULSIFICATION CLEAR CORNEA WITH STANDARD INTRAOCULAR LENS IMPLANT, ENDOSCOPIC CYCLOPHOTOCOAGULATION;  Surgeon: Yosvany Zamora MD;  Location:  [facility]     ZZC NONSPECIFIC PROCEDURE  1988    rt shoulder dislocation and fx     ZZC NONSPECIFIC PROCEDURE  1971    TL     Social History     Tobacco Use     Smoking status: Never Smoker     Smokeless tobacco: Never Used   Substance Use Topics     Alcohol use: Yes     Comment: occ     Current Outpatient Medications   Medication Sig Dispense Refill     amLODIPine (NORVASC) 5 MG tablet        Calcium 250 MG CAPS        Calcium Carb-Cholecalciferol (OYSTER SHELL CALCIUM) 500-400 MG-UNIT TABS Take 1 tablet by mouth       dorzolamide-timolol (COSOPT) 2-0.5 % ophthalmic  solution Place 1 drop into both eyes 2 times daily       fluticasone (FLONASE) 50 MCG/ACT spray Spray 1 spray into both nostrils daily       latanoprost (XALATAN) 0.005 % ophthalmic solution INSTILL 1 DROP IN BOTH EYES QPM       lisinopril-hydrochlorothiazide (ZESTORETIC) 20-25 MG tablet Take 1 tablet by mouth daily 30 tablet 0     Multiple Vitamin (MULTI-VITAMINS) TABS Take 1 tablet by mouth       potassium chloride ER (KLOR-CON M) 20 MEQ CR tablet Take 20 mEq by mouth       bimatoprost (LUMIGAN) 0.01 % SOLN Place 1 drop into both eyes At Bedtime (Patient not taking: Reported on 7/27/2022)       cloNIDine (CATAPRES) 0.1 MG tablet Take 1 tablet (0.1 mg) by mouth 2 times daily (Patient not taking: Reported on 7/27/2022) 60 tablet 0     GABAPENTIN PO Take 300 mg by mouth daily (Patient not taking: Reported on 7/27/2022)       No Known Allergies  Past medical history, past surgical history, current medications and allergies reviewed and accurate to the best of my knowledge.      ROS:  Refer to [facility]    BP (!) 168/90 (BP Location: Left arm, Patient Position: Sitting, Cuff Size: Adult Regular)   Pulse 67   Temp 97  F (36.1  C) (Temporal)   Resp 20   Wt 76.2 kg (168 lb 1.6 oz)   SpO2 97%   Breastfeeding No   BMI 28.85 kg/m      EXAM:  General Appearance: Well appearing 81 year old female, appropriate appearance for age. No acute distress    Respiratory: normal chest wall and respirations.  Normal effort.  Clear to auscultation bilaterally, no wheezing, crackles or rhonchi.  No increased work of breathing.  No cough appreciated.  Cardiac: RRR with no murmurs  Dermatological: bilateral lower extremities: left leg: there is 1 particular area with erythema surrounding insect bite elif and yellow gel-like discharge coming from the site. Another site is similar on the right lower extremity. These sites are tender to touch and mildly warm.  Psychological: normal affect, alert, oriented, and pleasant.     Results for orders  placed or performed in visit on 07/27/22   Wound Aerobic Bacterial Culture Routine     Status: None    Specimen: Leg, left; Wound   Result Value Ref Range    Culture No Growth           Winlevi Counseling:  I discussed with the patient the risks of topical clascoterone including but not limited to erythema, scaling, itching, and stinging. Patient voiced their understanding.

## 2025-02-09 ENCOUNTER — APPOINTMENT (OUTPATIENT)
Dept: GENERAL RADIOLOGY | Facility: CLINIC | Age: 85
End: 2025-02-09
Attending: EMERGENCY MEDICINE
Payer: COMMERCIAL

## 2025-02-09 ENCOUNTER — HOSPITAL ENCOUNTER (EMERGENCY)
Facility: CLINIC | Age: 85
Discharge: HOME | End: 2025-02-09
Attending: EMERGENCY MEDICINE | Admitting: EMERGENCY MEDICINE
Payer: COMMERCIAL

## 2025-02-09 VITALS
HEART RATE: 66 BPM | TEMPERATURE: 97.7 F | BODY MASS INDEX: 28.35 KG/M2 | OXYGEN SATURATION: 95 % | DIASTOLIC BLOOD PRESSURE: 61 MMHG | WEIGHT: 160 LBS | HEIGHT: 63 IN | RESPIRATION RATE: 17 BRPM | SYSTOLIC BLOOD PRESSURE: 120 MMHG

## 2025-02-09 DIAGNOSIS — R07.9 CHEST PAIN, UNSPECIFIED TYPE: ICD-10-CM

## 2025-02-09 LAB
ALBUMIN SERPL BCG-MCNC: 3.9 G/DL (ref 3.5–5.2)
ALP SERPL-CCNC: 59 U/L (ref 40–150)
ALT SERPL W P-5'-P-CCNC: 14 U/L (ref 0–50)
ANION GAP SERPL CALCULATED.3IONS-SCNC: 12 MMOL/L (ref 7–15)
AST SERPL W P-5'-P-CCNC: 16 U/L (ref 0–45)
ATRIAL RATE - MUSE: 65 BPM
BASOPHILS # BLD AUTO: 0 10E3/UL (ref 0–0.2)
BASOPHILS NFR BLD AUTO: 0 %
BILIRUB SERPL-MCNC: 0.3 MG/DL
BUN SERPL-MCNC: 21.3 MG/DL (ref 8–23)
CALCIUM SERPL-MCNC: 9.7 MG/DL (ref 8.8–10.4)
CHLORIDE SERPL-SCNC: 103 MMOL/L (ref 98–107)
CREAT SERPL-MCNC: 0.85 MG/DL (ref 0.51–0.95)
DIASTOLIC BLOOD PRESSURE - MUSE: NORMAL MMHG
EGFRCR SERPLBLD CKD-EPI 2021: 66 ML/MIN/1.73M2
EOSINOPHIL # BLD AUTO: 0.2 10E3/UL (ref 0–0.7)
EOSINOPHIL NFR BLD AUTO: 2 %
ERYTHROCYTE [DISTWIDTH] IN BLOOD BY AUTOMATED COUNT: 12.8 % (ref 10–15)
GLUCOSE SERPL-MCNC: 139 MG/DL (ref 70–99)
HCO3 SERPL-SCNC: 24 MMOL/L (ref 22–29)
HCT VFR BLD AUTO: 43.3 % (ref 35–47)
HGB BLD-MCNC: 14.6 G/DL (ref 11.7–15.7)
HOLD SPECIMEN: NORMAL
HOLD SPECIMEN: NORMAL
IMM GRANULOCYTES # BLD: 0 10E3/UL
IMM GRANULOCYTES NFR BLD: 0 %
INTERPRETATION ECG - MUSE: NORMAL
LYMPHOCYTES # BLD AUTO: 1.1 10E3/UL (ref 0.8–5.3)
LYMPHOCYTES NFR BLD AUTO: 12 %
MCH RBC QN AUTO: 31.9 PG (ref 26.5–33)
MCHC RBC AUTO-ENTMCNC: 33.7 G/DL (ref 31.5–36.5)
MCV RBC AUTO: 95 FL (ref 78–100)
MONOCYTES # BLD AUTO: 1.2 10E3/UL (ref 0–1.3)
MONOCYTES NFR BLD AUTO: 13 %
NEUTROPHILS # BLD AUTO: 6.4 10E3/UL (ref 1.6–8.3)
NEUTROPHILS NFR BLD AUTO: 71 %
NRBC # BLD AUTO: 0 10E3/UL
NRBC BLD AUTO-RTO: 0 /100
P AXIS - MUSE: 26 DEGREES
PLATELET # BLD AUTO: 274 10E3/UL (ref 150–450)
POTASSIUM SERPL-SCNC: 4.1 MMOL/L (ref 3.4–5.3)
PR INTERVAL - MUSE: 128 MS
PROT SERPL-MCNC: 6.5 G/DL (ref 6.4–8.3)
QRS DURATION - MUSE: 80 MS
QT - MUSE: 396 MS
QTC - MUSE: 411 MS
R AXIS - MUSE: -37 DEGREES
RBC # BLD AUTO: 4.58 10E6/UL (ref 3.8–5.2)
SODIUM SERPL-SCNC: 139 MMOL/L (ref 135–145)
SYSTOLIC BLOOD PRESSURE - MUSE: NORMAL MMHG
T AXIS - MUSE: 24 DEGREES
TROPONIN T SERPL HS-MCNC: 13 NG/L
TROPONIN T SERPL HS-MCNC: 13 NG/L
VENTRICULAR RATE- MUSE: 65 BPM
WBC # BLD AUTO: 9 10E3/UL (ref 4–11)

## 2025-02-09 PROCEDURE — 93005 ELECTROCARDIOGRAM TRACING: CPT

## 2025-02-09 PROCEDURE — 82040 ASSAY OF SERUM ALBUMIN: CPT | Performed by: EMERGENCY MEDICINE

## 2025-02-09 PROCEDURE — 71046 X-RAY EXAM CHEST 2 VIEWS: CPT

## 2025-02-09 PROCEDURE — 36415 COLL VENOUS BLD VENIPUNCTURE: CPT | Performed by: EMERGENCY MEDICINE

## 2025-02-09 PROCEDURE — 99285 EMERGENCY DEPT VISIT HI MDM: CPT | Mod: 25

## 2025-02-09 PROCEDURE — 82310 ASSAY OF CALCIUM: CPT | Performed by: EMERGENCY MEDICINE

## 2025-02-09 PROCEDURE — 84484 ASSAY OF TROPONIN QUANT: CPT | Performed by: EMERGENCY MEDICINE

## 2025-02-09 PROCEDURE — 85025 COMPLETE CBC W/AUTO DIFF WBC: CPT | Performed by: EMERGENCY MEDICINE

## 2025-02-09 PROCEDURE — 80053 COMPREHEN METABOLIC PANEL: CPT | Performed by: EMERGENCY MEDICINE

## 2025-02-09 ASSESSMENT — COLUMBIA-SUICIDE SEVERITY RATING SCALE - C-SSRS
1. IN THE PAST MONTH, HAVE YOU WISHED YOU WERE DEAD OR WISHED YOU COULD GO TO SLEEP AND NOT WAKE UP?: NO
2. HAVE YOU ACTUALLY HAD ANY THOUGHTS OF KILLING YOURSELF IN THE PAST MONTH?: NO

## 2025-02-09 ASSESSMENT — ACTIVITIES OF DAILY LIVING (ADL)
ADLS_ACUITY_SCORE: 41

## 2025-02-09 NOTE — ED PROVIDER NOTES
Emergency Department Note      History of Present Illness     Chief Complaint   Chest Pain      HPI   Josi Singh is a 84 year old female with a history of CAD, diverticular disease of colon, hypertension, hyperlipidemia, and hypokalemia who presents for an evaluation of chest pain. The patient reports experiencing intermittent episodes of 'spasms' in the lower left side of her chest since 1600 yesterday. She characterizes the aforementioned spasms as not a sharp pain, and something she has not felt before. They typically last for a second or less and she cannot ascertain if they come from inside or outside of her chest.  She indicates that the aforementioned episodes of spasms have become more frequent since onset, prompting this ED visit. Denies experiencing bilateral pain or edema in her lower extremities, shortness of breath, or pain during breathing. Further denies experiencing stomach pain or nausea.     The patient notes a familial history of heart issues. Specifically, her grandfather  after 3 heart attacks, and her mother had CHF. Her twin brother had heart issues with stents in place, and  recently.     Independent Historian   None    Past Medical History     Medical History and Problem List   CAD  Diverticular Disease of Colon  LISA  Glaucoma  History of endometrial cancer  Hypertension  Hyperlipidemia  Hypokalemia  Osteoporosis  Peripheral Sensory Neuropathy  Degeneration of Intervertebral Disc, site unspecified    Medications   Amlodipine  Dorzolamide HCI - Timolol Mal  Latanoprost  Lisinopril Hydrochlorothiazide  Potassium Chloride    Surgical History   Right Shoulder Dislocation Repair  TL  LYSSA  Tubal Ligation  Phacoemulsification clear cornea with standard intraocular lens implant, endoscopic cyclo photocoagulation, combined - right  Phacoemulsification clear cornea with standard intraocular lens implant, endoscopic cyclo photocoagulation, combined - left  Bilateral Hip  "Replacement  BSO    Physical Exam     Patient Vitals for the past 24 hrs:   BP Temp Temp src Pulse Resp SpO2 Height Weight   02/09/25 0215 -- -- -- 64 13 94 % -- --   02/09/25 0200 -- -- -- 63 11 95 % -- --   02/09/25 0152 126/71 -- -- 72 20 96 % -- --   02/09/25 0145 126/71 -- -- -- -- -- -- --   02/09/25 0139 (!) 152/80 97.7  F (36.5  C) Temporal 70 18 94 % 1.6 m (5' 3\") 72.6 kg (160 lb)     Physical Exam  General: Resting on the gurney, appears  uncomfortable  Head:  The scalp, face, and head appear normal  Mouth/Throat: Mucus membranes are moist  CV:  Regular rate    Normal S1 and S2  No pathological murmur   Resp:  Breath sounds clear and equal bilaterally    Non-labored, no retractions or accessory muscle use    No coarseness    No wheezing   GI:  Abdomen is soft, no rigidity    No tenderness to palpation  MS:  Normal motor assessment of all extremities.    Good capillary refill noted.    Skin:   No rash or lesions noted.  Neuro:   Speech is normal and fluent. No apparent deficit.  Psych: Awake. Alert.  Normal affect.      Appropriate interactions.     Diagnostics     Lab Results   Labs Ordered and Resulted from Time of ED Arrival to Time of ED Departure   COMPREHENSIVE METABOLIC PANEL - Abnormal       Result Value    Sodium 139      Potassium 4.1      Carbon Dioxide (CO2) 24      Anion Gap 12      Urea Nitrogen 21.3      Creatinine 0.85      GFR Estimate 66      Calcium 9.7      Chloride 103      Glucose 139 (*)     Alkaline Phosphatase 59      AST 16      ALT 14      Protein Total 6.5      Albumin 3.9      Bilirubin Total 0.3     TROPONIN T, HIGH SENSITIVITY - Normal    Troponin T, High Sensitivity 13     TROPONIN T, HIGH SENSITIVITY - Normal    Troponin T, High Sensitivity 13     CBC WITH PLATELETS AND DIFFERENTIAL    WBC Count 9.0      RBC Count 4.58      Hemoglobin 14.6      Hematocrit 43.3      MCV 95      MCH 31.9      MCHC 33.7      RDW 12.8      Platelet Count 274      % Neutrophils 71      % " Lymphocytes 12      % Monocytes 13      % Eosinophils 2      % Basophils 0      % Immature Granulocytes 0      NRBCs per 100 WBC 0      Absolute Neutrophils 6.4      Absolute Lymphocytes 1.1      Absolute Monocytes 1.2      Absolute Eosinophils 0.2      Absolute Basophils 0.0      Absolute Immature Granulocytes 0.0      Absolute NRBCs 0.0         Imaging   Chest XR,  PA & LAT   Final Result   IMPRESSION: Normal heart size and pulmonary vascularity. No acute infiltrates or consolidation. Small band of linear scarring or atelectasis left lower lung. Hypertrophic changes thoracic spine. Mild chronic appearing anterior wedging of a couple lower    thoracic vertebral bodies. Otherwise, unremarkable. No acute findings.             EKG   ECG taken at 0133, ECG read at 0231  Normal Sinus Rhythm  Left Axis Deviation  Abnormal ECG  Rate 65 bpm. ME interval 128 ms. QRS duration 80 ms. QT/QTc 396/411 ms. P-R-T axes 26 -37 24.    ED Course      ED Course   ED Course as of 02/09/25 0307   Sun Feb 09, 2025   0230 I obtained the history and evaluated the patient as noted above.    Rechecked patient and discussed lab findings  Rechecked patient discussed imaging findings  Rechecked patient discussed plan for discharge      Medical Decision Making / Diagnosis       MIGUEL   Josi Singh is a 84 year old female who presents complaining of left chest symptoms.  These are described as a brief spasm lasting less than a second.  They have been increasing in frequency causing her to be concerned.  She was watched on monitor and there was not a clear connection between this feeling and PVCs though she did have several PVCs on monitor.  Troponin was checked and was not consistent with acute coronary syndrome.  Her description of her pain would be quite atypical for ischemic pain as well.  I have recommended that she follow with her primary care doctor in the next several days.  At that time they may consider an event monitor or further cardiac  evaluation.  Additionally it is possible that she is developing zoster but has not yet developed a rash.  Regardless she will return to the emergency department with any true chest pain and follow-up with her primary care doctor regardless of symptoms.    Disposition   The patient was discharged.     Diagnosis     ICD-10-CM    1. Chest pain, unspecified type  R07.9            Scribe Disclosure:  Albert ASH, am serving as a scribe at 2:30 AM on 2/9/2025 to document services personally performed by Spring Hendricks MD based on my observations and the provider's statements to me.       Spring Hendricks MD  02/09/25 [id number]

## 2025-02-09 NOTE — ED TRIAGE NOTES
"Patient presents with intermittent, \"spasm\" pain to left lower chest that started yesterday.  She denies shortness of breath.  She denies hx of similar symptoms.  She took a 1/2 of 325 mg Aspirin.     Triage Assessment (Adult)       Row Name 02/09/25 0141          Triage Assessment    Airway WDL WDL        Respiratory WDL    Respiratory WDL WDL        Skin Circulation/Temperature WDL    Skin Circulation/Temperature WDL WDL        Cardiac WDL    Cardiac WDL X;chest pain         Chest Pain Assessment    Chest Pain Location anterior chest, left        Peripheral/Neurovascular WDL    Peripheral Neurovascular WDL WDL        Cognitive/Neuro/Behavioral WDL    Cognitive/Neuro/Behavioral WDL WDL        [place] Coma Scale    Best Eye Response 4-->(E4) spontaneous     Best Motor Response 6-->(M6) obeys commands     Best Verbal Response 5-->(V5) oriented     [place] Coma Scale Score 15                     "

## 2025-07-23 ENCOUNTER — OFFICE VISIT (OUTPATIENT)
Dept: FAMILY MEDICINE | Facility: OTHER | Age: 85
End: 2025-07-23
Attending: NURSE PRACTITIONER
Payer: COMMERCIAL

## 2025-07-23 VITALS
SYSTOLIC BLOOD PRESSURE: 128 MMHG | WEIGHT: 167.4 LBS | HEART RATE: 65 BPM | TEMPERATURE: 97.6 F | DIASTOLIC BLOOD PRESSURE: 78 MMHG | OXYGEN SATURATION: 96 % | RESPIRATION RATE: 16 BRPM | BODY MASS INDEX: 29.65 KG/M2

## 2025-07-23 DIAGNOSIS — R39.9 UTI SYMPTOMS: Primary | ICD-10-CM

## 2025-07-23 LAB
ALBUMIN UR-MCNC: NEGATIVE MG/DL
APPEARANCE UR: ABNORMAL
BACTERIA #/AREA URNS HPF: ABNORMAL /HPF
BILIRUB UR QL STRIP: NEGATIVE
COLOR UR AUTO: ABNORMAL
GLUCOSE UR STRIP-MCNC: 30 MG/DL
HGB UR QL STRIP: NEGATIVE
KETONES UR STRIP-MCNC: NEGATIVE MG/DL
LEUKOCYTE ESTERASE UR QL STRIP: ABNORMAL
MUCOUS THREADS #/AREA URNS LPF: PRESENT /LPF
NITRATE UR QL: NEGATIVE
PH UR STRIP: 6 [PH] (ref 5–9)
RBC URINE: 5 /HPF
SP GR UR STRIP: 1.01 (ref 1–1.03)
SQUAMOUS EPITHELIAL: 1 /HPF
UROBILINOGEN UR STRIP-MCNC: NORMAL MG/DL
WBC URINE: 37 /HPF

## 2025-07-23 PROCEDURE — G0463 HOSPITAL OUTPT CLINIC VISIT: HCPCS

## 2025-07-23 PROCEDURE — 81001 URINALYSIS AUTO W/SCOPE: CPT | Mod: ZL | Performed by: NURSE PRACTITIONER

## 2025-07-23 RX ORDER — HYDROCHLOROTHIAZIDE 25 MG/1
25 TABLET ORAL DAILY
COMMUNITY
Start: 2025-07-09

## 2025-07-23 RX ORDER — LOSARTAN POTASSIUM 25 MG/1
25 TABLET ORAL DAILY
COMMUNITY
Start: 2025-07-09

## 2025-07-23 ASSESSMENT — PAIN SCALES - GENERAL: PAINLEVEL_OUTOF10: MILD PAIN (2)

## 2025-07-23 NOTE — PROGRESS NOTES
"  Assessment & Plan   Problem List Items Addressed This Visit    None  Visit Diagnoses         UTI symptoms    -  Primary    Relevant Orders    UA Macroscopic with reflex to Microscopic and Culture (Completed)    Urine Culture           1. UTI symptoms (Primary)  - UA Macroscopic with reflex to Microscopic and Culture  - Urine Culture  Urinalysis shows some evidence of possible UTI, but symptoms are mild and not systemic. Will await urine culture and treat accordingly on Friday as discussed with the patient. Push fluids, cranberry juice ok, recommend urination and encouraging bladder emptying     BMI  Estimated body mass index is 29.65 kg/m  as calculated from the following:    Height as of 2/9/25: 1.6 m (5' 3\").    Weight as of this encounter: 75.9 kg (167 lb 6.4 oz).           Subjective   Josi is a 84 year old, presenting for the following health issues:  Urinary Problem (Tingling feeling and dysuria )        7/23/2025    10:43 AM   Additional Questions   Roomed by EDWIN Sahu   Accompanied by Self     History of Present Illness       Reason for visit:  Uti  Symptom onset:  1-3 days ago  Symptoms include:  Tingling  Symptom intensity:  Mild  Symptom progression:  Staying the same  Had these symptoms before:  Yes  Has tried/received treatment for these symptoms:  Yes  Previous treatment was successful:  Yes  Prior treatment description:  Antibiotics   She is taking medications regularly.     Josi is here to rule out urinary tract infection. She has some \"twinges\" with urination and wondering if she has a UTI. She has had a few UTIs in the past but not recently. Traveling Saturday and wants to not have any issues for that.     Hx endometrial cancer 6 years ago; had radiation, chemo, and total hysterectomy. She would say that her urinary tract is \"slowing down\" less force than she used to. No fevers, acute back pain, or nausea/vomiting.     No history of pyelonephritis or hospitalization. Kidney stone once 30 years " ago or more.     She does not have any urinary incontinence.       CALL MOBILE PHONE AND LEAVE MESSAGE CANDACE in [place]     She has had a chronic cough also - now she is off of lisinopril and the cough is actually improving.     Review of Systems  Constitutional, HEENT, cardiovascular, pulmonary, gi and gu systems are negative, except as otherwise noted.      Objective    /78   Pulse 65   Temp 97.6  F (36.4  C) (Temporal)   Resp 16   Wt 75.9 kg (167 lb 6.4 oz)   SpO2 96%   Breastfeeding No   BMI 29.65 kg/m    Body mass index is 29.65 kg/m .  Physical Exam  Vitals reviewed.   Constitutional:       General: She is not in acute distress.     Appearance: Normal appearance. She is normal weight. She is not ill-appearing or toxic-appearing.   Cardiovascular:      Rate and Rhythm: Normal rate and regular rhythm.      Heart sounds: Normal heart sounds. No murmur heard.  Pulmonary:      Effort: Pulmonary effort is normal. No respiratory distress.      Breath sounds: Normal breath sounds. No wheezing.   Abdominal:      General: There is no distension.      Tenderness: There is no abdominal tenderness. There is no right CVA tenderness or left CVA tenderness.   Musculoskeletal:         General: Normal range of motion.   Skin:     General: Skin is warm and dry.   Neurological:      General: No focal deficit present.      Mental Status: She is alert and oriented to person, place, and time.   Psychiatric:         Mood and Affect: Mood normal.                Results for orders placed or performed in visit on 07/23/25   UA Macroscopic with reflex to Microscopic and Culture     Status: Abnormal    Specimen: Urine, Clean Catch   Result Value Ref Range    Color Urine Light Yellow Colorless, Straw, Light Yellow, Yellow    Appearance Urine Slightly Cloudy (A) Clear    Glucose Urine 30 (A) Negative mg/dL    Bilirubin Urine Negative Negative    Ketones Urine Negative Negative mg/dL    Specific Gravity Urine 1.014  1.000 - 1.030    Blood Urine Negative Negative    pH Urine 6.0 5.0 - 9.0    Protein Albumin Urine Negative Negative mg/dL    Urobilinogen Urine Normal Normal mg/dL    Nitrite Urine Negative Negative    Leukocyte Esterase Urine Moderate (A) Negative    Bacteria Urine Few (A) None Seen /HPF    Mucus Urine Present (A) None Seen /LPF    RBC Urine 5 (H) <=2 /HPF    WBC Urine 37 (H) <=5 /HPF    Squamous Epithelials Urine 1 <=1 /HPF    Narrative    Urine Culture ordered based on laboratory criteria         Signed Electronically by: Adelina Soto NP

## 2025-07-23 NOTE — PATIENT INSTRUCTIONS
In 2 days when the urine culture comes back - will let you know if you need antibiotics on Friday and send prescription.

## 2025-07-23 NOTE — NURSING NOTE
"Chief Complaint   Patient presents with    Urinary Problem     Tingling feeling and dysuria        Initial /78   Pulse 65   Temp 97.6  F (36.4  C) (Temporal)   Resp 16   Wt 75.9 kg (167 lb 6.4 oz)   SpO2 96%   Breastfeeding No   BMI 29.65 kg/m   Estimated body mass index is 29.65 kg/m  as calculated from the following:    Height as of 2/9/25: 1.6 m (5' 3\").    Weight as of this encounter: 75.9 kg (167 lb 6.4 oz).  Medication Review: complete    The next two questions are to help us understand your food security.  If you are feeling you need any assistance in this area, we have resources available to support you today.          7/23/2025   SDOH- Food Insecurity   Within the past 12 months, did you worry that your food would run out before you got money to buy more? N   Within the past 12 months, did the food you bought just not last and you didn t have money to get more? N         Health Care Directive:  Patient does not have a Health Care Directive: Discussed advance care planning with patient; however, patient declined at this time.    Adelina Robledo CMA      "

## 2025-07-24 LAB — BACTERIA UR CULT: ABNORMAL

## 2025-09-03 ENCOUNTER — HOSPITAL ENCOUNTER (INPATIENT)
Facility: CLINIC | Age: 85
End: 2025-09-03
Attending: EMERGENCY MEDICINE | Admitting: INTERNAL MEDICINE
Payer: COMMERCIAL

## 2025-09-03 DIAGNOSIS — K86.89 DILATION OF PANCREATIC DUCT: ICD-10-CM

## 2025-09-03 DIAGNOSIS — R74.01 TRANSAMINITIS: ICD-10-CM

## 2025-09-03 DIAGNOSIS — R17 PAINLESS JAUNDICE: Primary | ICD-10-CM

## 2025-09-03 DIAGNOSIS — K83.8 DILATION OF BILIARY TRACT: ICD-10-CM

## 2025-09-03 LAB
ALBUMIN SERPL BCG-MCNC: 3.3 G/DL (ref 3.5–5.2)
ALBUMIN UR-MCNC: NEGATIVE MG/DL
ALP SERPL-CCNC: 285 U/L (ref 40–150)
ALT SERPL W P-5'-P-CCNC: 148 U/L (ref 0–50)
ANION GAP SERPL CALCULATED.3IONS-SCNC: 16 MMOL/L (ref 7–15)
APPEARANCE UR: CLEAR
AST SERPL W P-5'-P-CCNC: 57 U/L (ref 0–45)
BILIRUB SERPL-MCNC: 8.5 MG/DL
BILIRUB UR QL STRIP: ABNORMAL
BUN SERPL-MCNC: 12.4 MG/DL (ref 8–23)
CALCIUM SERPL-MCNC: 9.4 MG/DL (ref 8.8–10.4)
CHLORIDE SERPL-SCNC: 97 MMOL/L (ref 98–107)
COLOR UR AUTO: ABNORMAL
CREAT SERPL-MCNC: 0.71 MG/DL (ref 0.51–0.95)
EGFRCR SERPLBLD CKD-EPI 2021: 82 ML/MIN/1.73M2
ERYTHROCYTE [DISTWIDTH] IN BLOOD BY AUTOMATED COUNT: 15.4 % (ref 10–15)
GLUCOSE SERPL-MCNC: 196 MG/DL (ref 70–99)
GLUCOSE UR STRIP-MCNC: NEGATIVE MG/DL
HCO3 SERPL-SCNC: 22 MMOL/L (ref 22–29)
HCT VFR BLD AUTO: 36.7 % (ref 35–47)
HGB BLD-MCNC: 12.6 G/DL (ref 11.7–15.7)
HGB UR QL STRIP: NEGATIVE
INR PPP: 0.92 (ref 0.85–1.15)
KETONES UR STRIP-MCNC: NEGATIVE MG/DL
LEUKOCYTE ESTERASE UR QL STRIP: NEGATIVE
LIPASE SERPL-CCNC: 54 U/L (ref 13–60)
MAGNESIUM SERPL-MCNC: 1.8 MG/DL (ref 1.7–2.3)
MCH RBC QN AUTO: 32.3 PG (ref 26.5–33)
MCHC RBC AUTO-ENTMCNC: 34.3 G/DL (ref 31.5–36.5)
MCV RBC AUTO: 94.1 FL (ref 78–100)
MUCOUS THREADS #/AREA URNS LPF: PRESENT /LPF
NITRATE UR QL: NEGATIVE
PH UR STRIP: 6 [PH] (ref 5–7)
PLATELET # BLD AUTO: 319 10E3/UL (ref 150–450)
POTASSIUM SERPL-SCNC: 3.5 MMOL/L (ref 3.4–5.3)
PROCALCITONIN SERPL IA-MCNC: 0.15 NG/ML
PROT SERPL-MCNC: 6.2 G/DL (ref 6.4–8.3)
PROTHROMBIN TIME: 12.2 SECONDS (ref 11.8–14.8)
RBC # BLD AUTO: 3.9 10E6/UL (ref 3.8–5.2)
RBC URINE: 1 /HPF
SODIUM SERPL-SCNC: 135 MMOL/L (ref 135–145)
SP GR UR STRIP: 1.02 (ref 1–1.03)
SQUAMOUS EPITHELIAL: 1 /HPF
UROBILINOGEN UR STRIP-MCNC: NORMAL MG/DL
WBC # BLD AUTO: 8.13 10E3/UL (ref 4–11)
WBC URINE: 4 /HPF

## 2025-09-03 PROCEDURE — A9585 GADOBUTROL INJECTION: HCPCS | Performed by: INTERNAL MEDICINE

## 2025-09-03 PROCEDURE — 250N000009 HC RX 250: Performed by: EMERGENCY MEDICINE

## 2025-09-03 PROCEDURE — 99285 EMERGENCY DEPT VISIT HI MDM: CPT | Mod: 25 | Performed by: EMERGENCY MEDICINE

## 2025-09-03 PROCEDURE — 85610 PROTHROMBIN TIME: CPT | Performed by: EMERGENCY MEDICINE

## 2025-09-03 PROCEDURE — 258N000003 HC RX IP 258 OP 636: Performed by: EMERGENCY MEDICINE

## 2025-09-03 PROCEDURE — 258N000003 HC RX IP 258 OP 636: Performed by: INTERNAL MEDICINE

## 2025-09-03 PROCEDURE — 83735 ASSAY OF MAGNESIUM: CPT | Performed by: EMERGENCY MEDICINE

## 2025-09-03 PROCEDURE — 96361 HYDRATE IV INFUSION ADD-ON: CPT

## 2025-09-03 PROCEDURE — 36415 COLL VENOUS BLD VENIPUNCTURE: CPT | Performed by: EMERGENCY MEDICINE

## 2025-09-03 PROCEDURE — 85027 COMPLETE CBC AUTOMATED: CPT | Performed by: EMERGENCY MEDICINE

## 2025-09-03 PROCEDURE — 80053 COMPREHEN METABOLIC PANEL: CPT | Performed by: EMERGENCY MEDICINE

## 2025-09-03 PROCEDURE — 255N000002 HC RX 255 OP 636: Performed by: EMERGENCY MEDICINE

## 2025-09-03 PROCEDURE — 120N000001 HC R&B MED SURG/OB

## 2025-09-03 PROCEDURE — 84145 PROCALCITONIN (PCT): CPT | Performed by: EMERGENCY MEDICINE

## 2025-09-03 PROCEDURE — 255N000002 HC RX 255 OP 636: Performed by: INTERNAL MEDICINE

## 2025-09-03 PROCEDURE — 250N000013 HC RX MED GY IP 250 OP 250 PS 637: Performed by: INTERNAL MEDICINE

## 2025-09-03 PROCEDURE — 96360 HYDRATION IV INFUSION INIT: CPT | Mod: 59

## 2025-09-03 PROCEDURE — 82565 ASSAY OF CREATININE: CPT | Performed by: EMERGENCY MEDICINE

## 2025-09-03 PROCEDURE — 81001 URINALYSIS AUTO W/SCOPE: CPT | Performed by: EMERGENCY MEDICINE

## 2025-09-03 PROCEDURE — 83690 ASSAY OF LIPASE: CPT | Performed by: EMERGENCY MEDICINE

## 2025-09-03 RX ORDER — CALCIUM CARBONATE 500 MG/1
1000 TABLET, CHEWABLE ORAL 4 TIMES DAILY PRN
Status: DISPENSED | OUTPATIENT
Start: 2025-09-03

## 2025-09-03 RX ORDER — AMLODIPINE BESYLATE 2.5 MG/1
2.5 TABLET ORAL DAILY
Status: DISPENSED | OUTPATIENT
Start: 2025-09-04

## 2025-09-03 RX ORDER — ONDANSETRON 4 MG/1
4 TABLET, ORALLY DISINTEGRATING ORAL EVERY 6 HOURS PRN
Status: DISPENSED | OUTPATIENT
Start: 2025-09-03

## 2025-09-03 RX ORDER — GADOBUTROL 604.72 MG/ML
7 INJECTION INTRAVENOUS ONCE
Status: COMPLETED | OUTPATIENT
Start: 2025-09-03 | End: 2025-09-03

## 2025-09-03 RX ORDER — HYDROCHLOROTHIAZIDE 12.5 MG/1
25 TABLET ORAL DAILY
Status: ACTIVE | OUTPATIENT
Start: 2025-09-04

## 2025-09-03 RX ORDER — DORZOLAMIDE HYDROCHLORIDE AND TIMOLOL MALEATE 20; 5 MG/ML; MG/ML
1 SOLUTION/ DROPS OPHTHALMIC 2 TIMES DAILY
Status: DISPENSED | OUTPATIENT
Start: 2025-09-03

## 2025-09-03 RX ORDER — AMOXICILLIN 250 MG
1 CAPSULE ORAL 2 TIMES DAILY PRN
Status: ACTIVE | OUTPATIENT
Start: 2025-09-03

## 2025-09-03 RX ORDER — POTASSIUM CHLORIDE 1500 MG/1
20 TABLET, EXTENDED RELEASE ORAL DAILY
Status: ACTIVE | OUTPATIENT
Start: 2025-09-04

## 2025-09-03 RX ORDER — AMLODIPINE BESYLATE 2.5 MG/1
2.5 TABLET ORAL DAILY
Status: ON HOLD | COMMUNITY

## 2025-09-03 RX ORDER — SODIUM CHLORIDE 9 MG/ML
INJECTION, SOLUTION INTRAVENOUS CONTINUOUS
Status: ACTIVE | OUTPATIENT
Start: 2025-09-03

## 2025-09-03 RX ORDER — AMOXICILLIN 250 MG
2 CAPSULE ORAL 2 TIMES DAILY PRN
Status: ACTIVE | OUTPATIENT
Start: 2025-09-03

## 2025-09-03 RX ORDER — ONDANSETRON 2 MG/ML
4 INJECTION INTRAMUSCULAR; INTRAVENOUS EVERY 6 HOURS PRN
Status: DISPENSED | OUTPATIENT
Start: 2025-09-03

## 2025-09-03 RX ORDER — LATANOPROST 50 UG/ML
1 SOLUTION/ DROPS OPHTHALMIC EVERY EVENING
Status: DISPENSED | OUTPATIENT
Start: 2025-09-03

## 2025-09-03 RX ORDER — LIDOCAINE 40 MG/G
CREAM TOPICAL
Status: ACTIVE | OUTPATIENT
Start: 2025-09-03

## 2025-09-03 RX ADMIN — IOHEXOL 89 ML: 350 INJECTION, SOLUTION INTRAVENOUS at 13:10

## 2025-09-03 RX ADMIN — GADOBUTROL 7 ML: 604.72 INJECTION INTRAVENOUS at 21:29

## 2025-09-03 RX ADMIN — DORZOLAMIDE HYDROCHLORIDE AND TIMOLOL MALEATE 1 DROP: 22.3; 6.8 SOLUTION/ DROPS OPHTHALMIC at 22:07

## 2025-09-03 RX ADMIN — SODIUM CHLORIDE: 0.9 INJECTION, SOLUTION INTRAVENOUS at 17:58

## 2025-09-03 RX ADMIN — SODIUM CHLORIDE 1000 ML: 0.9 INJECTION, SOLUTION INTRAVENOUS at 12:19

## 2025-09-03 RX ADMIN — LATANOPROST 1 DROP: 50 SOLUTION OPHTHALMIC at 22:06

## 2025-09-03 RX ADMIN — SODIUM CHLORIDE 65 ML: 9 INJECTION, SOLUTION INTRAVENOUS at 13:10

## 2025-09-03 ASSESSMENT — ACTIVITIES OF DAILY LIVING (ADL)
ADLS_ACUITY_SCORE: 21
ADLS_ACUITY_SCORE: 41
ADLS_ACUITY_SCORE: 24
ADLS_ACUITY_SCORE: 41
ADLS_ACUITY_SCORE: 24
ADLS_ACUITY_SCORE: 41
ADLS_ACUITY_SCORE: 21
ADLS_ACUITY_SCORE: 21
ADLS_ACUITY_SCORE: 41

## 2025-09-03 ASSESSMENT — COLUMBIA-SUICIDE SEVERITY RATING SCALE - C-SSRS
2. HAVE YOU ACTUALLY HAD ANY THOUGHTS OF KILLING YOURSELF IN THE PAST MONTH?: NO
6. HAVE YOU EVER DONE ANYTHING, STARTED TO DO ANYTHING, OR PREPARED TO DO ANYTHING TO END YOUR LIFE?: NO
1. IN THE PAST MONTH, HAVE YOU WISHED YOU WERE DEAD OR WISHED YOU COULD GO TO SLEEP AND NOT WAKE UP?: NO

## 2025-09-04 ENCOUNTER — ANESTHESIA (OUTPATIENT)
Dept: SURGERY | Facility: CLINIC | Age: 85
End: 2025-09-04
Payer: COMMERCIAL

## 2025-09-04 ENCOUNTER — ANESTHESIA EVENT (OUTPATIENT)
Dept: SURGERY | Facility: CLINIC | Age: 85
End: 2025-09-04
Payer: COMMERCIAL

## 2025-09-04 VITALS
DIASTOLIC BLOOD PRESSURE: 66 MMHG | WEIGHT: 158 LBS | SYSTOLIC BLOOD PRESSURE: 130 MMHG | HEIGHT: 63 IN | OXYGEN SATURATION: 94 % | RESPIRATION RATE: 16 BRPM | TEMPERATURE: 98.4 F | BODY MASS INDEX: 28 KG/M2 | HEART RATE: 82 BPM

## 2025-09-04 LAB
ALBUMIN SERPL BCG-MCNC: 3.1 G/DL (ref 3.5–5.2)
ALP SERPL-CCNC: 270 U/L (ref 40–150)
ALT SERPL W P-5'-P-CCNC: 112 U/L (ref 0–50)
ANION GAP SERPL CALCULATED.3IONS-SCNC: 13 MMOL/L (ref 7–15)
AST SERPL W P-5'-P-CCNC: 39 U/L (ref 0–45)
BILIRUB SERPL-MCNC: 8.4 MG/DL
BUN SERPL-MCNC: 8.1 MG/DL (ref 8–23)
CALCIUM SERPL-MCNC: 9 MG/DL (ref 8.8–10.4)
CHLORIDE SERPL-SCNC: 101 MMOL/L (ref 98–107)
CREAT SERPL-MCNC: 0.66 MG/DL (ref 0.51–0.95)
EGFRCR SERPLBLD CKD-EPI 2021: 84 ML/MIN/1.73M2
ERYTHROCYTE [DISTWIDTH] IN BLOOD BY AUTOMATED COUNT: 15.6 % (ref 10–15)
GLUCOSE SERPL-MCNC: 127 MG/DL (ref 70–99)
HCO3 SERPL-SCNC: 24 MMOL/L (ref 22–29)
HCT VFR BLD AUTO: 34.7 % (ref 35–47)
HGB BLD-MCNC: 12 G/DL (ref 11.7–15.7)
HOLD SPECIMEN: NORMAL
INR PPP: 1.05 (ref 0.85–1.15)
MCH RBC QN AUTO: 32.7 PG (ref 26.5–33)
MCHC RBC AUTO-ENTMCNC: 34.6 G/DL (ref 31.5–36.5)
MCV RBC AUTO: 94.6 FL (ref 78–100)
PLATELET # BLD AUTO: 265 10E3/UL (ref 150–450)
POTASSIUM SERPL-SCNC: 2.7 MMOL/L (ref 3.4–5.3)
POTASSIUM SERPL-SCNC: 3 MMOL/L (ref 3.4–5.3)
POTASSIUM SERPL-SCNC: 3.1 MMOL/L (ref 3.4–5.3)
PROT SERPL-MCNC: 5.5 G/DL (ref 6.4–8.3)
PROTHROMBIN TIME: 13.5 SECONDS (ref 11.8–14.8)
RBC # BLD AUTO: 3.67 10E6/UL (ref 3.8–5.2)
SODIUM SERPL-SCNC: 138 MMOL/L (ref 135–145)
WBC # BLD AUTO: 5.91 10E3/UL (ref 4–11)

## 2025-09-04 PROCEDURE — 250N000009 HC RX 250

## 2025-09-04 PROCEDURE — C1769 GUIDE WIRE: HCPCS | Performed by: INTERNAL MEDICINE

## 2025-09-04 PROCEDURE — 370N000017 HC ANESTHESIA TECHNICAL FEE, PER MIN: Performed by: INTERNAL MEDICINE

## 2025-09-04 PROCEDURE — 84132 ASSAY OF SERUM POTASSIUM: CPT | Performed by: INTERNAL MEDICINE

## 2025-09-04 PROCEDURE — 86301 IMMUNOASSAY TUMOR CA 19-9: CPT | Performed by: INTERNAL MEDICINE

## 2025-09-04 PROCEDURE — 82310 ASSAY OF CALCIUM: CPT | Performed by: INTERNAL MEDICINE

## 2025-09-04 PROCEDURE — 250N000011 HC RX IP 250 OP 636: Performed by: INTERNAL MEDICINE

## 2025-09-04 PROCEDURE — 255N000002 HC RX 255 OP 636: Performed by: INTERNAL MEDICINE

## 2025-09-04 PROCEDURE — 85048 AUTOMATED LEUKOCYTE COUNT: CPT | Performed by: INTERNAL MEDICINE

## 2025-09-04 PROCEDURE — 360N000083 HC SURGERY LEVEL 3 W/ FLUORO, PER MIN: Performed by: INTERNAL MEDICINE

## 2025-09-04 PROCEDURE — 258N000003 HC RX IP 258 OP 636

## 2025-09-04 PROCEDURE — 250N000013 HC RX MED GY IP 250 OP 250 PS 637: Performed by: INTERNAL MEDICINE

## 2025-09-04 PROCEDURE — 120N000001 HC R&B MED SURG/OB

## 2025-09-04 PROCEDURE — 85610 PROTHROMBIN TIME: CPT | Performed by: INTERNAL MEDICINE

## 2025-09-04 PROCEDURE — C1874 STENT, COATED/COV W/DEL SYS: HCPCS | Performed by: INTERNAL MEDICINE

## 2025-09-04 PROCEDURE — 36415 COLL VENOUS BLD VENIPUNCTURE: CPT | Performed by: INTERNAL MEDICINE

## 2025-09-04 PROCEDURE — 710N000009 HC RECOVERY PHASE 1, LEVEL 1, PER MIN: Performed by: INTERNAL MEDICINE

## 2025-09-04 PROCEDURE — 258N000003 HC RX IP 258 OP 636: Performed by: INTERNAL MEDICINE

## 2025-09-04 PROCEDURE — 999N000141 HC STATISTIC PRE-PROCEDURE NURSING ASSESSMENT: Performed by: INTERNAL MEDICINE

## 2025-09-04 PROCEDURE — 272N000001 HC OR GENERAL SUPPLY STERILE: Performed by: INTERNAL MEDICINE

## 2025-09-04 PROCEDURE — 250N000011 HC RX IP 250 OP 636

## 2025-09-04 RX ORDER — NALOXONE HYDROCHLORIDE 0.4 MG/ML
0.2 INJECTION, SOLUTION INTRAMUSCULAR; INTRAVENOUS; SUBCUTANEOUS
Status: ACTIVE | OUTPATIENT
Start: 2025-09-04

## 2025-09-04 RX ORDER — SODIUM CHLORIDE, SODIUM LACTATE, POTASSIUM CHLORIDE, CALCIUM CHLORIDE 600; 310; 30; 20 MG/100ML; MG/100ML; MG/100ML; MG/100ML
INJECTION, SOLUTION INTRAVENOUS CONTINUOUS
Status: DISCONTINUED | OUTPATIENT
Start: 2025-09-04 | End: 2025-09-04 | Stop reason: HOSPADM

## 2025-09-04 RX ORDER — ACETAMINOPHEN 650 MG/1
650 SUPPOSITORY RECTAL EVERY 4 HOURS PRN
Status: ACTIVE | OUTPATIENT
Start: 2025-09-04

## 2025-09-04 RX ORDER — POTASSIUM CHLORIDE 1500 MG/1
20 TABLET, EXTENDED RELEASE ORAL ONCE
Status: COMPLETED | OUTPATIENT
Start: 2025-09-04 | End: 2025-09-04

## 2025-09-04 RX ORDER — NALOXONE HYDROCHLORIDE 0.4 MG/ML
0.4 INJECTION, SOLUTION INTRAMUSCULAR; INTRAVENOUS; SUBCUTANEOUS
Status: ACTIVE | OUTPATIENT
Start: 2025-09-04

## 2025-09-04 RX ORDER — POTASSIUM CHLORIDE 1500 MG/1
40 TABLET, EXTENDED RELEASE ORAL ONCE
Status: COMPLETED | OUTPATIENT
Start: 2025-09-04 | End: 2025-09-04

## 2025-09-04 RX ORDER — SODIUM CHLORIDE, SODIUM LACTATE, POTASSIUM CHLORIDE, CALCIUM CHLORIDE 600; 310; 30; 20 MG/100ML; MG/100ML; MG/100ML; MG/100ML
INJECTION, SOLUTION INTRAVENOUS CONTINUOUS PRN
Status: DISCONTINUED | OUTPATIENT
Start: 2025-09-04 | End: 2025-09-04

## 2025-09-04 RX ORDER — LIDOCAINE HYDROCHLORIDE 20 MG/ML
INJECTION, SOLUTION INFILTRATION; PERINEURAL PRN
Status: DISCONTINUED | OUTPATIENT
Start: 2025-09-04 | End: 2025-09-04

## 2025-09-04 RX ORDER — ACETAMINOPHEN 325 MG/1
650 TABLET ORAL EVERY 4 HOURS PRN
Status: DISPENSED | OUTPATIENT
Start: 2025-09-04

## 2025-09-04 RX ORDER — FLUMAZENIL 0.1 MG/ML
0.2 INJECTION, SOLUTION INTRAVENOUS
Status: ACTIVE | OUTPATIENT
Start: 2025-09-04 | End: 2025-09-05

## 2025-09-04 RX ORDER — POTASSIUM CHLORIDE 7.45 MG/ML
10 INJECTION INTRAVENOUS
Status: DISCONTINUED | OUTPATIENT
Start: 2025-09-04 | End: 2025-09-04

## 2025-09-04 RX ORDER — POTASSIUM CHLORIDE 1500 MG/1
40 TABLET, EXTENDED RELEASE ORAL ONCE
Status: DISCONTINUED | OUTPATIENT
Start: 2025-09-04 | End: 2025-09-04

## 2025-09-04 RX ORDER — PROPOFOL 10 MG/ML
INJECTION, EMULSION INTRAVENOUS CONTINUOUS PRN
Status: DISCONTINUED | OUTPATIENT
Start: 2025-09-04 | End: 2025-09-04

## 2025-09-04 RX ORDER — POTASSIUM CHLORIDE 1500 MG/1
20 TABLET, EXTENDED RELEASE ORAL ONCE
Status: DISCONTINUED | OUTPATIENT
Start: 2025-09-04 | End: 2025-09-04

## 2025-09-04 RX ADMIN — SODIUM CHLORIDE: 0.9 INJECTION, SOLUTION INTRAVENOUS at 08:26

## 2025-09-04 RX ADMIN — SODIUM CHLORIDE, POTASSIUM CHLORIDE, SODIUM LACTATE AND CALCIUM CHLORIDE: 600; 310; 30; 20 INJECTION, SOLUTION INTRAVENOUS at 12:32

## 2025-09-04 RX ADMIN — LIDOCAINE HYDROCHLORIDE 40 MG: 20 INJECTION, SOLUTION INFILTRATION; PERINEURAL at 12:41

## 2025-09-04 RX ADMIN — DORZOLAMIDE HYDROCHLORIDE AND TIMOLOL MALEATE 1 DROP: 22.3; 6.8 SOLUTION/ DROPS OPHTHALMIC at 08:27

## 2025-09-04 RX ADMIN — ONDANSETRON 4 MG: 2 INJECTION INTRAMUSCULAR; INTRAVENOUS at 13:40

## 2025-09-04 RX ADMIN — AMLODIPINE BESYLATE 2.5 MG: 2.5 TABLET ORAL at 08:26

## 2025-09-04 RX ADMIN — DORZOLAMIDE HYDROCHLORIDE AND TIMOLOL MALEATE 1 DROP: 22.3; 6.8 SOLUTION/ DROPS OPHTHALMIC at 20:35

## 2025-09-04 RX ADMIN — ACETAMINOPHEN 650 MG: 325 TABLET ORAL at 20:35

## 2025-09-04 RX ADMIN — ACETAMINOPHEN 650 MG: 325 TABLET ORAL at 16:23

## 2025-09-04 RX ADMIN — POTASSIUM CHLORIDE 20 MEQ: 1500 TABLET, EXTENDED RELEASE ORAL at 22:25

## 2025-09-04 RX ADMIN — POTASSIUM CHLORIDE 40 MEQ: 1500 TABLET, EXTENDED RELEASE ORAL at 20:35

## 2025-09-04 RX ADMIN — ONDANSETRON 4 MG: 4 TABLET, ORALLY DISINTEGRATING ORAL at 20:35

## 2025-09-04 RX ADMIN — POTASSIUM CHLORIDE 40 MEQ: 1500 TABLET, EXTENDED RELEASE ORAL at 09:45

## 2025-09-04 RX ADMIN — POTASSIUM CHLORIDE 20 MEQ: 1500 TABLET, EXTENDED RELEASE ORAL at 14:33

## 2025-09-04 RX ADMIN — LOSARTAN POTASSIUM 12.5 MG: 25 TABLET, FILM COATED ORAL at 08:26

## 2025-09-04 RX ADMIN — ONDANSETRON 4 MG: 2 INJECTION INTRAMUSCULAR; INTRAVENOUS at 20:44

## 2025-09-04 RX ADMIN — LIDOCAINE HYDROCHLORIDE 40 MG: 20 INJECTION, SOLUTION INFILTRATION; PERINEURAL at 12:51

## 2025-09-04 RX ADMIN — PROPOFOL 30 MG: 10 INJECTION, EMULSION INTRAVENOUS at 12:47

## 2025-09-04 RX ADMIN — PROPOFOL 125 MCG/KG/MIN: 10 INJECTION, EMULSION INTRAVENOUS at 12:40

## 2025-09-04 RX ADMIN — PROPOFOL 50 MG: 10 INJECTION, EMULSION INTRAVENOUS at 12:39

## 2025-09-04 RX ADMIN — LATANOPROST 1 DROP: 50 SOLUTION OPHTHALMIC at 20:35

## 2025-09-04 RX ADMIN — CALCIUM CARBONATE (ANTACID) CHEW TAB 500 MG 1000 MG: 500 CHEW TAB at 14:39

## 2025-09-04 ASSESSMENT — ACTIVITIES OF DAILY LIVING (ADL)
ADLS_ACUITY_SCORE: 24

## 2025-09-04 ASSESSMENT — LIFESTYLE VARIABLES: TOBACCO_USE: 0

## (undated) DEVICE — TAPE MICROPORE 2"X1.5YD 1530S-2

## (undated) DEVICE — EYE PACK BVI READYPAK KIT #1

## (undated) DEVICE — GLOVE PROTEXIS MICRO 8.5  2D73PM85

## (undated) DEVICE — EYE PACK CUSTOM ANTERIOR 30DEG TIP CENTURION PPK6682-04

## (undated) DEVICE — EYE SOL BSS 500ML

## (undated) DEVICE — PACK CATARACT CUSTOM SO DALE SEY32CTFCX

## (undated) DEVICE — EYE SHIELD PLASTIC

## (undated) DEVICE — GLOVE PROTEXIS MICRO 7.0  2D73PM70

## (undated) DEVICE — GOWN LG DISP 9515

## (undated) DEVICE — EYE KNIFE SLIT XSTAR VISITEC 2.4MM 45DEG BEVEL UP 373724

## (undated) DEVICE — LINEN TOWEL PACK X5 5464

## (undated) DEVICE — GLOVE PROTEXIS MICRO 6.5  2D73PM65

## (undated) RX ORDER — CLONIDINE HYDROCHLORIDE 0.1 MG/1
TABLET ORAL
Status: DISPENSED
Start: 2020-10-15

## (undated) RX ORDER — CLONIDINE HYDROCHLORIDE 0.1 MG/1
TABLET ORAL
Status: DISPENSED
Start: 2020-10-13

## (undated) RX ORDER — FENTANYL CITRATE 50 UG/ML
INJECTION, SOLUTION INTRAMUSCULAR; INTRAVENOUS
Status: DISPENSED
Start: 2018-02-01

## (undated) RX ORDER — TETRACAINE HYDROCHLORIDE 5 MG/ML
SOLUTION OPHTHALMIC
Status: DISPENSED
Start: 2017-12-14

## (undated) RX ORDER — DEXAMETHASONE SODIUM PHOSPHATE 4 MG/ML
INJECTION, SOLUTION INTRA-ARTICULAR; INTRALESIONAL; INTRAMUSCULAR; INTRAVENOUS; SOFT TISSUE
Status: DISPENSED
Start: 2025-09-04

## (undated) RX ORDER — POTASSIUM CHLORIDE 1500 MG/1
TABLET, EXTENDED RELEASE ORAL
Status: DISPENSED
Start: 2020-10-14

## (undated) RX ORDER — LIDOCAINE HYDROCHLORIDE 10 MG/ML
INJECTION, SOLUTION EPIDURAL; INFILTRATION; INTRACAUDAL; PERINEURAL
Status: DISPENSED
Start: 2017-12-14

## (undated) RX ORDER — ONDANSETRON 2 MG/ML
INJECTION INTRAMUSCULAR; INTRAVENOUS
Status: DISPENSED
Start: 2025-09-04

## (undated) RX ORDER — POTASSIUM CHLORIDE 1500 MG/1
TABLET, EXTENDED RELEASE ORAL
Status: DISPENSED
Start: 2020-10-13

## (undated) RX ORDER — LIDOCAINE HYDROCHLORIDE 10 MG/ML
INJECTION, SOLUTION EPIDURAL; INFILTRATION; INTRACAUDAL; PERINEURAL
Status: DISPENSED
Start: 2018-02-01

## (undated) RX ORDER — OXYMETAZOLINE HYDROCHLORIDE 0.05 G/100ML
SPRAY NASAL
Status: DISPENSED
Start: 2020-10-12

## (undated) RX ORDER — LORAZEPAM 2 MG/ML
INJECTION INTRAMUSCULAR
Status: DISPENSED
Start: 2020-10-14

## (undated) RX ORDER — LABETALOL HYDROCHLORIDE 5 MG/ML
INJECTION, SOLUTION INTRAVENOUS
Status: DISPENSED
Start: 2020-10-12

## (undated) RX ORDER — FENTANYL CITRATE 50 UG/ML
INJECTION, SOLUTION INTRAMUSCULAR; INTRAVENOUS
Status: DISPENSED
Start: 2017-12-14

## (undated) RX ORDER — TETRACAINE HYDROCHLORIDE 5 MG/ML
SOLUTION OPHTHALMIC
Status: DISPENSED
Start: 2020-08-08

## (undated) RX ORDER — FENTANYL CITRATE 50 UG/ML
INJECTION, SOLUTION INTRAMUSCULAR; INTRAVENOUS
Status: DISPENSED
Start: 2025-09-04

## (undated) RX ORDER — TETRACAINE HYDROCHLORIDE 5 MG/ML
SOLUTION OPHTHALMIC
Status: DISPENSED
Start: 2018-02-01

## (undated) RX ORDER — LIDOCAINE HYDROCHLORIDE 20 MG/ML
INJECTION, SOLUTION EPIDURAL; INFILTRATION; INTRACAUDAL; PERINEURAL
Status: DISPENSED
Start: 2017-12-14

## (undated) RX ORDER — ONDANSETRON 2 MG/ML
INJECTION INTRAMUSCULAR; INTRAVENOUS
Status: DISPENSED
Start: 2018-02-01